# Patient Record
Sex: FEMALE | Race: WHITE | NOT HISPANIC OR LATINO | Employment: OTHER | URBAN - METROPOLITAN AREA
[De-identification: names, ages, dates, MRNs, and addresses within clinical notes are randomized per-mention and may not be internally consistent; named-entity substitution may affect disease eponyms.]

---

## 2017-02-06 ENCOUNTER — HOSPITAL ENCOUNTER (OUTPATIENT)
Facility: HOSPITAL | Age: 82
Setting detail: OBSERVATION
LOS: 1 days | Discharge: HOME | End: 2017-02-07
Attending: EMERGENCY MEDICINE | Admitting: INTERNAL MEDICINE

## 2017-02-06 ENCOUNTER — APPOINTMENT (OUTPATIENT)
Dept: GENERAL RADIOLOGY | Facility: HOSPITAL | Age: 82
End: 2017-02-06

## 2017-02-06 DIAGNOSIS — E87.6 HYPOKALEMIA: ICD-10-CM

## 2017-02-06 DIAGNOSIS — I49.5 SICK SINUS SYNDROME (HCC): Primary | ICD-10-CM

## 2017-02-06 LAB
ALBUMIN SERPL-MCNC: 3.6 G/DL (ref 3.5–5.2)
ALBUMIN/GLOB SERPL: 1.2 G/DL
ALP SERPL-CCNC: 58 U/L (ref 39–117)
ALT SERPL W P-5'-P-CCNC: 18 U/L (ref 1–33)
ANION GAP SERPL CALCULATED.3IONS-SCNC: 16.7 MMOL/L
AST SERPL-CCNC: 27 U/L (ref 1–32)
BASOPHILS # BLD AUTO: 0.01 10*3/MM3 (ref 0–0.2)
BASOPHILS NFR BLD AUTO: 0.2 % (ref 0–1.5)
BILIRUB SERPL-MCNC: 0.4 MG/DL (ref 0.1–1.2)
BUN BLD-MCNC: 31 MG/DL (ref 8–23)
BUN/CREAT SERPL: 24.6 (ref 7–25)
CALCIUM SPEC-SCNC: 9.1 MG/DL (ref 8.6–10.5)
CHLORIDE SERPL-SCNC: 98 MMOL/L (ref 98–107)
CO2 SERPL-SCNC: 22.3 MMOL/L (ref 22–29)
CREAT BLD-MCNC: 1.26 MG/DL (ref 0.57–1)
DEPRECATED RDW RBC AUTO: 43.4 FL (ref 37–54)
EOSINOPHIL # BLD AUTO: 0.01 10*3/MM3 (ref 0–0.7)
EOSINOPHIL NFR BLD AUTO: 0.2 % (ref 0.3–6.2)
ERYTHROCYTE [DISTWIDTH] IN BLOOD BY AUTOMATED COUNT: 13.9 % (ref 11.7–13)
GFR SERPL CREATININE-BSD FRML MDRD: 40 ML/MIN/1.73
GLOBULIN UR ELPH-MCNC: 3 GM/DL
GLUCOSE BLD-MCNC: 145 MG/DL (ref 65–99)
HCT VFR BLD AUTO: 37.6 % (ref 35.6–45.5)
HGB BLD-MCNC: 12.9 G/DL (ref 11.9–15.5)
HOLD SPECIMEN: NORMAL
HOLD SPECIMEN: NORMAL
IMM GRANULOCYTES # BLD: 0 10*3/MM3 (ref 0–0.03)
IMM GRANULOCYTES NFR BLD: 0 % (ref 0–0.5)
INR PPP: 1.01 (ref 0.9–1.1)
LYMPHOCYTES # BLD AUTO: 1.07 10*3/MM3 (ref 0.9–4.8)
LYMPHOCYTES NFR BLD AUTO: 26.6 % (ref 19.6–45.3)
MAGNESIUM SERPL-MCNC: 1.8 MG/DL (ref 1.6–2.4)
MCH RBC QN AUTO: 29.3 PG (ref 26.9–32)
MCHC RBC AUTO-ENTMCNC: 34.3 G/DL (ref 32.4–36.3)
MCV RBC AUTO: 85.5 FL (ref 80.5–98.2)
MONOCYTES # BLD AUTO: 0.42 10*3/MM3 (ref 0.2–1.2)
MONOCYTES NFR BLD AUTO: 10.4 % (ref 5–12)
NEUTROPHILS # BLD AUTO: 2.51 10*3/MM3 (ref 1.9–8.1)
NEUTROPHILS NFR BLD AUTO: 62.6 % (ref 42.7–76)
NT-PROBNP SERPL-MCNC: 58.5 PG/ML (ref 0–1800)
PLATELET # BLD AUTO: 133 10*3/MM3 (ref 140–500)
PMV BLD AUTO: 11.4 FL (ref 6–12)
POTASSIUM BLD-SCNC: 3.4 MMOL/L (ref 3.5–5.2)
PROT SERPL-MCNC: 6.6 G/DL (ref 6–8.5)
PROTHROMBIN TIME: 12.9 SECONDS (ref 11.7–14.2)
RBC # BLD AUTO: 4.4 10*6/MM3 (ref 3.9–5.2)
SODIUM BLD-SCNC: 137 MMOL/L (ref 136–145)
T4 FREE SERPL-MCNC: 1.44 NG/DL (ref 0.93–1.7)
TROPONIN T SERPL-MCNC: <0.01 NG/ML (ref 0–0.03)
TSH SERPL DL<=0.05 MIU/L-ACNC: 0.95 MIU/ML (ref 0.27–4.2)
WBC NRBC COR # BLD: 4.02 10*3/MM3 (ref 4.5–10.7)
WHOLE BLOOD HOLD SPECIMEN: NORMAL
WHOLE BLOOD HOLD SPECIMEN: NORMAL

## 2017-02-06 PROCEDURE — C1898 LEAD, PMKR, OTHER THAN TRANS: HCPCS | Performed by: INTERNAL MEDICINE

## 2017-02-06 PROCEDURE — 99222 1ST HOSP IP/OBS MODERATE 55: CPT | Performed by: INTERNAL MEDICINE

## 2017-02-06 PROCEDURE — 93010 ELECTROCARDIOGRAM REPORT: CPT | Performed by: INTERNAL MEDICINE

## 2017-02-06 PROCEDURE — 93005 ELECTROCARDIOGRAM TRACING: CPT | Performed by: EMERGENCY MEDICINE

## 2017-02-06 PROCEDURE — 25010000002 FENTANYL CITRATE (PF) 100 MCG/2ML SOLUTION: Performed by: INTERNAL MEDICINE

## 2017-02-06 PROCEDURE — 25010000002 MIDAZOLAM PER 1 MG: Performed by: INTERNAL MEDICINE

## 2017-02-06 PROCEDURE — 33208 INSRT HEART PM ATRIAL & VENT: CPT | Performed by: INTERNAL MEDICINE

## 2017-02-06 PROCEDURE — 71010 HC CHEST PA OR AP: CPT

## 2017-02-06 PROCEDURE — C1769 GUIDE WIRE: HCPCS | Performed by: INTERNAL MEDICINE

## 2017-02-06 PROCEDURE — 85025 COMPLETE CBC W/AUTO DIFF WBC: CPT | Performed by: EMERGENCY MEDICINE

## 2017-02-06 PROCEDURE — 25010000003 CEFAZOLIN PER 500 MG: Performed by: INTERNAL MEDICINE

## 2017-02-06 PROCEDURE — 85610 PROTHROMBIN TIME: CPT | Performed by: EMERGENCY MEDICINE

## 2017-02-06 PROCEDURE — C1785 PMKR, DUAL, RATE-RESP: HCPCS | Performed by: INTERNAL MEDICINE

## 2017-02-06 PROCEDURE — 99152 MOD SED SAME PHYS/QHP 5/>YRS: CPT | Performed by: INTERNAL MEDICINE

## 2017-02-06 PROCEDURE — 83880 ASSAY OF NATRIURETIC PEPTIDE: CPT | Performed by: EMERGENCY MEDICINE

## 2017-02-06 PROCEDURE — 83735 ASSAY OF MAGNESIUM: CPT | Performed by: EMERGENCY MEDICINE

## 2017-02-06 PROCEDURE — 99285 EMERGENCY DEPT VISIT HI MDM: CPT

## 2017-02-06 PROCEDURE — C1892 INTRO/SHEATH,FIXED,PEEL-AWAY: HCPCS | Performed by: INTERNAL MEDICINE

## 2017-02-06 PROCEDURE — 84443 ASSAY THYROID STIM HORMONE: CPT | Performed by: EMERGENCY MEDICINE

## 2017-02-06 PROCEDURE — 93041 RHYTHM ECG TRACING: CPT | Performed by: EMERGENCY MEDICINE

## 2017-02-06 PROCEDURE — 80053 COMPREHEN METABOLIC PANEL: CPT | Performed by: EMERGENCY MEDICINE

## 2017-02-06 PROCEDURE — 84439 ASSAY OF FREE THYROXINE: CPT | Performed by: EMERGENCY MEDICINE

## 2017-02-06 PROCEDURE — 99153 MOD SED SAME PHYS/QHP EA: CPT | Performed by: INTERNAL MEDICINE

## 2017-02-06 PROCEDURE — 84484 ASSAY OF TROPONIN QUANT: CPT | Performed by: EMERGENCY MEDICINE

## 2017-02-06 DEVICE — PACEMAKER
Type: IMPLANTABLE DEVICE | Status: FUNCTIONAL
Brand: ESSENTIO™ DR

## 2017-02-06 DEVICE — PACE/SENSE LEAD
Type: IMPLANTABLE DEVICE | Status: FUNCTIONAL
Brand: INGEVITY™ MRI

## 2017-02-06 DEVICE — IMPLANTABLE DEVICE: Type: IMPLANTABLE DEVICE | Status: FUNCTIONAL

## 2017-02-06 RX ORDER — SODIUM CHLORIDE 9 MG/ML
INJECTION, SOLUTION INTRAVENOUS CONTINUOUS PRN
Status: DISCONTINUED | OUTPATIENT
Start: 2017-02-06 | End: 2017-02-06 | Stop reason: HOSPADM

## 2017-02-06 RX ORDER — LIDOCAINE HYDROCHLORIDE 10 MG/ML
INJECTION, SOLUTION INFILTRATION; PERINEURAL AS NEEDED
Status: DISCONTINUED | OUTPATIENT
Start: 2017-02-06 | End: 2017-02-06 | Stop reason: HOSPADM

## 2017-02-06 RX ORDER — POTASSIUM CHLORIDE 1.5 G/1.77G
40 POWDER, FOR SOLUTION ORAL ONCE
Status: COMPLETED | OUTPATIENT
Start: 2017-02-06 | End: 2017-02-06

## 2017-02-06 RX ORDER — SODIUM CHLORIDE 0.9 % (FLUSH) 0.9 %
10 SYRINGE (ML) INJECTION AS NEEDED
Status: DISCONTINUED | OUTPATIENT
Start: 2017-02-06 | End: 2017-02-07 | Stop reason: HOSPADM

## 2017-02-06 RX ORDER — ONDANSETRON 2 MG/ML
4 INJECTION INTRAMUSCULAR; INTRAVENOUS EVERY 6 HOURS PRN
Status: DISCONTINUED | OUTPATIENT
Start: 2017-02-06 | End: 2017-02-07 | Stop reason: HOSPADM

## 2017-02-06 RX ORDER — ASPIRIN 325 MG
325 TABLET ORAL ONCE
Status: DISCONTINUED | OUTPATIENT
Start: 2017-02-06 | End: 2017-02-06

## 2017-02-06 RX ORDER — SODIUM CHLORIDE 0.9 % (FLUSH) 0.9 %
1-10 SYRINGE (ML) INJECTION AS NEEDED
Status: DISCONTINUED | OUTPATIENT
Start: 2017-02-06 | End: 2017-02-07 | Stop reason: HOSPADM

## 2017-02-06 RX ORDER — VALSARTAN AND HYDROCHLOROTHIAZIDE 160; 12.5 MG/1; MG/1
1 TABLET, FILM COATED ORAL DAILY
Status: DISCONTINUED | OUTPATIENT
Start: 2017-02-06 | End: 2017-02-07 | Stop reason: HOSPADM

## 2017-02-06 RX ORDER — FENTANYL CITRATE 50 UG/ML
INJECTION, SOLUTION INTRAMUSCULAR; INTRAVENOUS AS NEEDED
Status: DISCONTINUED | OUTPATIENT
Start: 2017-02-06 | End: 2017-02-06 | Stop reason: HOSPADM

## 2017-02-06 RX ORDER — AMLODIPINE BESYLATE 5 MG/1
5 TABLET ORAL
Status: DISCONTINUED | OUTPATIENT
Start: 2017-02-06 | End: 2017-02-07 | Stop reason: HOSPADM

## 2017-02-06 RX ORDER — OXYCODONE HYDROCHLORIDE AND ACETAMINOPHEN 5; 325 MG/1; MG/1
1 TABLET ORAL EVERY 4 HOURS PRN
Status: DISCONTINUED | OUTPATIENT
Start: 2017-02-06 | End: 2017-02-07 | Stop reason: HOSPADM

## 2017-02-06 RX ORDER — MIDAZOLAM HYDROCHLORIDE 1 MG/ML
INJECTION INTRAMUSCULAR; INTRAVENOUS AS NEEDED
Status: DISCONTINUED | OUTPATIENT
Start: 2017-02-06 | End: 2017-02-06 | Stop reason: HOSPADM

## 2017-02-06 RX ADMIN — SODIUM CHLORIDE 500 ML: 9 INJECTION, SOLUTION INTRAVENOUS at 12:51

## 2017-02-06 RX ADMIN — CEFAZOLIN SODIUM 1 G: 1 INJECTION, SOLUTION INTRAVENOUS at 15:43

## 2017-02-06 RX ADMIN — POTASSIUM CHLORIDE 40 MEQ: 1.5 POWDER, FOR SOLUTION ORAL at 13:53

## 2017-02-06 NOTE — H&P
Patient Name: Alexandria Alcocer  Age/Sex: 86 y.o. female  : 3/8/1930  MRN: [id number]    Date of Admission: 2017  Date of Encounter Visit: 17  Encounter Provider: Liban Suazo MD  Place of Service: [facility] CARDIOLOGY      Referring Provider: Liban Suazo MD  Patient Care Team:  Jen Schmidt MD as PCP - General  Jen Schmidt MD as PCP - Family Medicine    Subjective:     Admitted for: sick sinus syndrome    Chief Complaint: palpitations and weakness    History of Present Illness:  Alexandria Alcocer is a 86 y.o. female, who is known to me, with a documented h/o atrial arrhythmias, carotid atherosclerosis, syncope, HTN, hyperglycemia, hypercholesterolemia, GERD and Bell's palsy. She was last seen by me on 10/29/2015 at which time her rhythm showed sinus arrest/sinus arrhythmia. She was asymptomatic and pauses were not long enough to warrant a permanent pacemaker. She was to contact my office if she became symptomatic at any time. No medication changes were made.     She presented to the ED this am with c/o palpitations, generalized weakness and near-syncope with diaphoresis. Prior to arrival to ED she had chest tightness, soa and lightheadedness, but upon arrival she only c/o soa. Telemetry showed an irregular heart rhythm with periods of bradycardia and tachycardia, rates . BP 92/69.BUN/Creat 31/1.26, K 3.4, troponin neg, thyroid panel neg, proBNP 58. CXR neg.      We are admitting for possible permanent pacemaker placement. She is NPO.    Previous testing:   Holter 2015    Echo 2014      Past Medical History:  Past Medical History   Diagnosis Date   • Arthritis    • Bell's palsy    • Cataract    • Cholelithiasis       s/p cholecystectomy ,    • Degenerative disc disease, thoracic    • Esophageal reflux      10/08 Dr Fuller   • Esophagitis      EDG 10/08 Dr Fuller   • H/O Doppler ultrasound of artery carotid      nl ,  has a plaque without stenosis in the R bifurcation ICA   • H/O echocardiogram 2D      07/2009: EF 60%, mild MR, mild TR, PA pressure 38, diastolic dysfunction 07/2014: EF 55%, grade I diast dysf-n   • Hemorrhoids    • Herpes zoster      7/2011   • Hypercholesterolemia    • Hyperglycemia    • Lung nodule seen on imaging study      none seen on the x ray 11/2014   • Mammogram abnormal      2013 had normal f/u evaluation normal 7/14 [facility] 7/25/15   • Near syncope 8/26/2016   • Neuralgia, postherpetic    • Occupational bursitis      right shoulder   • Osteopenia      Dexa 4/7/2011   • Postherpetic polyneuropathy    • Tubular adenoma of colon       . 10/08, 3 polyps with mild dysplasia, 11/11 2 polyps transverse colon, also had hyperplastic polyp in 2002   • Vasovagal syncope      1992   • Visit for screening mammogram      nl 06/18/13, 07/2014 [facility]       Past Surgical History   Procedure Laterality Date   • Bronchoscopy       1996, bronchoscopy with Bx - inflammatory lung nodule   • Cataract extraction     • Cholecystectomy       Dr Ruiz   • Colonoscopy       · , 11/29/2011, tubular adenoma 3-5 mm x 2 in the transverse colon.   • Polypectomy     • Incisional breast biopsy       1998 also had ducrogram due to D/C   • Tonsillectomy     • Total abdominal hysterectomy       with removal of both ovaries       Home Medications:     (Not in a hospital admission)    Allergies:  No Known Allergies    Past Social History:  Social History     Social History   • Marital status: Single     Spouse name: N/A   • Number of children: N/A   • Years of education: N/A     Occupational History   • Not on file.     Social History Main Topics   • Smoking status: Never Smoker   • Smokeless tobacco: Not on file   • Alcohol use No   • Drug use: No   • Sexual activity: Defer     Other Topics Concern   • Not on file     Social History Narrative   • No narrative on file        Past Family History:  Family History   Problem  Relation Age of Onset   • Breast cancer Mother    • Lung cancer Mother    • Cardiomyopathy Mother    • Hypertension Mother    • Breast cancer Sister    • Lymphoma Sister    • Hypertension Sister    • Macular degeneration Sister    • Diabetes Brother    • Hypertension Brother    • Lung cancer Brother        Review of Systems: All systems reviewed. Pertinent positives identified in HPI. All other systems are negative.     REVIEW OF SYSTEMS:   CONSTITUTIONAL: No weight loss, fever, chills, complain of dizziness and fatigue near syncope   HEENT: Eyes: No visual loss, blurred vision, double vision or yellow sclerae. Ears, Nose, Throat: No hearing loss, sneezing, congestion, runny nose or sore throat.   SKIN: No rash or itching.     RESPIRATORY: No shortness of breath, hemoptysis, cough or sputum.     GASTROINTESTINAL: No anorexia, nausea, vomiting or diarrhea. No abdominal pain, bright red blood per rectum or melena.  GENITOURINARY: No burning on urination, hematuria or increased frequency.  NEUROLOGICAL: No headache, dizziness, syncope, paralysis, ataxia, numbness or tingling in the extremities. No change in bowel or bladder control.   MUSCULOSKELETAL: No muscle, back pain, joint pain or stiffness.   HEMATOLOGIC: No anemia, bleeding or bruising.   LYMPHATICS: No enlarged nodes. No history of splenectomy.   PSYCHIATRIC: No history of depression, anxiety, hallucinations.   ENDOCRINOLOGIC: No reports of sweating, cold or heat intolerance. No polyuria or polydipsia.       Objective:     Objective:  Temp:  [97.8 °F (36.6 °C)] 97.8 °F (36.6 °C)  Heart Rate:  [] 75  Resp:  [16-18] 18  BP: ()/(56-81) 117/56    Intake/Output Summary (Last 24 hours) at 02/06/17 1359  Last data filed at 02/06/17 1311   Gross per 24 hour   Intake    500 ml   Output      0 ml   Net    500 ml     Body mass index is 20.6 kg/(m^2).  Last 3 weights    02/06/17  1200   Weight: 120 lb (54.4 kg)           Physical Exam:   General Appearance  Well developed, cooperative and well nourished and no acute distress   Head Normocephalic, without abnormality, atraumatic   Ears Ears appear intact with no abnormalities noted   Throat No oral lesions, no thrush, oral mucosa moist   Neck No adenopathy, supple, trachea midline, no thyromegaly, no carotid bruit, no JVD   Back No skin lesions, erythema or scars, no tenderness to percussion or palptaion,range of motion is normal   Lungs Clear to auscultation,respirations regular, even and unlabored   Heart  irregular rhythm no S3 nor S4 no murmurs are noted.   Chest wall No abnormalities observed   Abdomen Normal bowel sounds, no masses, no hepatomegaly,    Extremities Moves all extremities well, no edema, no cyanosis, no redness   Pulses Pulses palpable and equal bilaterally. Normal radial, carotid, femoral, dorsalis pedis and posterior tibial pulses bilaterally. Normal abdominal aorta   Skin No bleeding, bruising or rash   Psyhiatric Alert and oriented x 3, normal mood and affect        Lab Review:       Results from last 7 days  Lab Units 02/06/17  1227   SODIUM mmol/L 137   POTASSIUM mmol/L 3.4*   CHLORIDE mmol/L 98   TOTAL CO2 mmol/L 22.3   BUN mg/dL 31*   CREATININE mg/dL 1.26*   GLUCOSE mg/dL 145*   CALCIUM mg/dL 9.1   AST (SGOT) U/L 27   ALT (SGPT) U/L 18       Results from last 7 days  Lab Units 02/06/17  1227   TROPONIN T ng/mL <0.010       Results from last 7 days  Lab Units 02/06/17  1227   WBC 10*3/mm3 4.02*   HEMOGLOBIN g/dL 12.9   HEMATOCRIT % 37.6   PLATELETS 10*3/mm3 133*       Results from last 7 days  Lab Units 02/06/17  1227   INR  1.01           Results from last 7 days  Lab Units 02/06/17  1227   MAGNESIUM mg/dL 1.8           Results from last 7 days  Lab Units 02/06/17  1227   PROBNP pg/mL 58.5           Results from last 7 days  Lab Units 02/06/17  1227   TSH mIU/mL 0.947       Imaging:    Imaging Results (most recent)     Procedure Component Value Units Date/Time    XR Chest 1 View [[id number]]       Updated:  02/06/17 1255          EKG:         Baseline:     I personally viewed and interpreted the patient's EKG/Telemetry data.    Assessment:   Assessment/Plan       Active Problems:    Sick sinus syndrome    The patient has tachybradycardia syndrome.  She is very symptomatic with pauses.  This is an issue that has been present now for 2 years at least and is now worse.  The patient is now much more symptomatic especially in the last week or so.  I explained to her permanent pacemaker implant which she needs and also the requirement for medication.  The risks and benefits of the pacemaker have been explained in detail and she is agreeable to proceed.    Plan:           Thank you for allowing me to participate in the care of Alexandria Alcocer. Feel free to contact me directly with any further questions or concerns.    Liban Suazo MD  [facility]  02/06/17  1:59 PM

## 2017-02-06 NOTE — ED NOTES
Patient reports that congestion started Thursday.  This morning patient reports eating breakfast and went to lay on couch.  While sitting on couch patient had near syncope with diaphoresis.  Patient loses breath while talking.  Reports feeling chest tightness that is resolving.     Mine Llanes RN  02/06/17 1154       Mine Lalnes RN  02/06/17 1153

## 2017-02-06 NOTE — ED NOTES
External pacer pads applied to pt's chest and back upon her arrival to ER #[id number], which were then hooked up to Zoll monitor along with Zoll leads for monitoring.     Milana Ellington RN  02/06/17 5848

## 2017-02-06 NOTE — PLAN OF CARE
Problem: Patient Care Overview (Adult)  Goal: Plan of Care Review  Outcome: Ongoing (interventions implemented as appropriate)    02/06/17 1446   Coping/Psychosocial Response Interventions   Plan Of Care Reviewed With patient   Patient Care Overview   Progress no change   Outcome Evaluation   Outcome Summary/Follow up Plan PT to go for PPM this afternoon (here for SSS. On arrival to [facility] HR 60-80's in SR). Other VSS. No c/o CP.        Goal: Adult Individualization and Mutuality  Outcome: Ongoing (interventions implemented as appropriate)  Goal: Discharge Needs Assessment  Outcome: Ongoing (interventions implemented as appropriate)    Problem: Cardiac Output, Decreased (Adult)  Goal: Identify Related Risk Factors and Signs and Symptoms  Outcome: Ongoing (interventions implemented as appropriate)  Goal: Adequate Cardiac Output/Effective Tissue Perfusion  Outcome: Ongoing (interventions implemented as appropriate)    Problem: Pain, Acute (Adult)  Goal: Identify Related Risk Factors and Signs and Symptoms  Outcome: Ongoing (interventions implemented as appropriate)  Goal: Acceptable Pain Control/Comfort Level  Outcome: Ongoing (interventions implemented as appropriate)    Problem: Fall Risk (Adult)  Goal: Identify Related Risk Factors and Signs and Symptoms  Outcome: Ongoing (interventions implemented as appropriate)  Goal: Absence of Falls  Outcome: Ongoing (interventions implemented as appropriate)    Problem: Arrhythmia/Dysrhythmia (Symptomatic) (Adult)  Goal: Signs and Symptoms of Listed Potential Problems Will be Absent or Manageable (Arrhythmia/Dysrhythmia)  Outcome: Ongoing (interventions implemented as appropriate)

## 2017-02-06 NOTE — ED NOTES
Arrived in [facility] and placed pt on in-room monitors.  External pacer pads left in place on pt.  Requested that receiving RN, Niecy, bring their unit's Zoll monitor to hook to pacer pads as we unhooked the ER Zoll monitor.  Niecy, RN, stated she would get it shortly.     Milana Ellington RN  02/06/17 7088

## 2017-02-06 NOTE — ED PROVIDER NOTES
EMERGENCY DEPARTMENT ENCOUNTER    CHIEF COMPLAINT  Chief Complaint: palpitations and weakness  History given by: patient and pt's niece   History limited by: nothing   Room Number: 2211/1  PMD: Roxy Schmidt MD    HPI:  Pt is a 86 y.o. female who presents complaining of palpitations and generalized weakness. In triage, pt described a near-syncopal episode with diaphoresis that occurred this morning while pt was lying down. Pt has also complained of improving chest tightness, SOA, and lightheadedness, but her only complaint at this time is SOA. Per pt's niece, Dr. Suazo thought that pt might need a pacemaker soon the last time he saw her.      Onset: this morning  Timing: constant   Location: generalized   Radiation: does not radiate   Quality: new  Intensity/Severity: moderate   Progression: unchanged   Associated Symptoms: palpitations, weakness, SOA, lightheadedness, chest pain, near-syncope  Aggravating Factors: none specified   Alleviating Factors: none specified   Previous Episodes: none specified   Treatment before arrival: none specified     PAST MEDICAL HISTORY  Active Ambulatory Problems     Diagnosis Date Noted   • Benign essential hypertension 03/21/2016   • Carotid atherosclerosis 03/21/2016   • Chronic low back pain 03/21/2016   • Hyperlipidemia 03/21/2016   • Impaired fasting glucose 03/21/2016   • Osteopenia 03/21/2016   • Sinus arrhythmia 03/21/2016   • Health care maintenance 06/10/2016   • Lightheadedness 10/07/2016   • Carpal tunnel syndrome of right wrist 12/16/2016     Resolved Ambulatory Problems     Diagnosis Date Noted   • Near syncope 08/26/2016     Past Medical History   Diagnosis Date   • Arthritis    • Bell's palsy    • Cataract    • Cholelithiasis    • Degenerative disc disease, thoracic    • Esophageal reflux    • Esophagitis    • H/O Doppler ultrasound of artery carotid    • H/O echocardiogram 2D    • Hemorrhoids    • Herpes zoster    • Hypercholesterolemia    • Hyperglycemia     • Lung nodule seen on imaging study    • Mammogram abnormal    • Neuralgia, postherpetic    • Occupational bursitis    • Postherpetic polyneuropathy    • Tubular adenoma of colon    • Vasovagal syncope    • Visit for screening mammogram        PAST SURGICAL HISTORY  Past Surgical History   Procedure Laterality Date   • Bronchoscopy       1996, bronchoscopy with Bx - inflammatory lung nodule   • Cataract extraction     • Cholecystectomy       Dr Ruiz   • Colonoscopy       · , 11/29/2011, tubular adenoma 3-5 mm x 2 in the transverse colon.   • Polypectomy     • Incisional breast biopsy       1998 also had ducrogram due to D/C   • Tonsillectomy     • Total abdominal hysterectomy       with removal of both ovaries       FAMILY HISTORY  Family History   Problem Relation Age of Onset   • Breast cancer Mother    • Lung cancer Mother    • Cardiomyopathy Mother    • Hypertension Mother    • Breast cancer Sister    • Lymphoma Sister    • Hypertension Sister    • Macular degeneration Sister    • Diabetes Brother    • Hypertension Brother    • Lung cancer Brother        SOCIAL HISTORY  Social History     Social History   • Marital status: Single     Spouse name: N/A   • Number of children: N/A   • Years of education: N/A     Occupational History   • Not on file.     Social History Main Topics   • Smoking status: Never Smoker   • Smokeless tobacco: Not on file   • Alcohol use No   • Drug use: No   • Sexual activity: Defer     Other Topics Concern   • Not on file     Social History Narrative   • No narrative on file       ALLERGIES  Review of patient's allergies indicates no known allergies.    REVIEW OF SYSTEMS  Review of Systems   Constitutional: Negative for fever.   HENT: Negative for sore throat.    Eyes: Negative.    Respiratory: Positive for shortness of breath. Negative for cough.    Cardiovascular: Positive for palpitations. Negative for chest pain.   Gastrointestinal: Negative for abdominal pain, diarrhea  and vomiting.   Genitourinary: Negative for dysuria.   Musculoskeletal: Negative for neck pain.   Skin: Negative for rash.   Allergic/Immunologic: Negative.    Neurological: Positive for weakness (generalized) and light-headedness. Negative for numbness and headaches.   Hematological: Negative.    Psychiatric/Behavioral: Negative.    All other systems reviewed and are negative.      PHYSICAL EXAM  ED Triage Vitals   Temp Heart Rate Resp BP SpO2   02/06/17 1200 02/06/17 1200 02/06/17 1200 02/06/17 1200 02/06/17 1200   97.8 °F (36.6 °C) 147 16 92/69 95 %      Temp src Heart Rate Source Patient Position BP Location FiO2 (%)   02/06/17 1200 02/06/17 1200 02/06/17 1200 02/06/17 1200 --   Tympanic Monitor Sitting Right arm        Physical Exam   Constitutional: She is oriented to person, place, and time.   Eyes: Pupils are equal, round, and reactive to light.   Cardiovascular:   Periods of bradycardia in 40's and then rapid Afib in the 150's.    Pulmonary/Chest: She has rales in the right lower field and the left lower field.   Abdominal: Soft. There is no tenderness.   Musculoskeletal: She exhibits no edema or tenderness.   Neurological: She is alert and oriented to person, place, and time.   Nonfocal neuro exam.   Skin: No rash noted.   Psychiatric: Her mood appears anxious.       LAB RESULTS  Lab Results (last 24 hours)     Procedure Component Value Units Date/Time    CBC & Differential [[id number]] Collected:  02/06/17 1227    Specimen:  Blood Updated:  02/06/17 1240    Narrative:       The following orders were created for panel order CBC & Differential.  Procedure                               Abnormality         Status                     ---------                               -----------         ------                     CBC Auto Differential[[id number]]         Abnormal            Final result                 Please view results for these tests on the individual orders.    Comprehensive Metabolic Panel [[id number]]   (Abnormal) Collected:  02/06/17 1227    Specimen:  Blood Updated:  02/06/17 1307     Glucose 145 (H) mg/dL      BUN 31 (H) mg/dL      Creatinine 1.26 (H) mg/dL      Sodium 137 mmol/L      Potassium 3.4 (L) mmol/L      Chloride 98 mmol/L      CO2 22.3 mmol/L      Calcium 9.1 mg/dL      Total Protein 6.6 g/dL      Albumin 3.60 g/dL      ALT (SGPT) 18 U/L      AST (SGOT) 27 U/L      Alkaline Phosphatase 58 U/L      Total Bilirubin 0.4 mg/dL      eGFR Non African Amer 40 (L) mL/min/1.73      Globulin 3.0 gm/dL      A/G Ratio 1.2 g/dL      BUN/Creatinine Ratio 24.6      Anion Gap 16.7 mmol/L     Narrative:       The MDRD GFR formula is only valid for adults with stable renal function between ages 18 and 70.    Troponin [[id number]]  (Normal) Collected:  02/06/17 1227    Specimen:  Blood Updated:  02/06/17 1312     Troponin T <0.010 ng/mL     Narrative:       Troponin T Reference Ranges:  Less than 0.03 ng/mL:    Negative for AMI  0.03 to 0.09 ng/mL:      Indeterminant for AMI  Greater than 0.09 ng/mL: Positive for AMI    CBC Auto Differential [[id number]]  (Abnormal) Collected:  02/06/17 1227    Specimen:  Blood Updated:  02/06/17 1240     WBC 4.02 (L) 10*3/mm3      RBC 4.40 10*6/mm3      Hemoglobin 12.9 g/dL      Hematocrit 37.6 %      MCV 85.5 fL      MCH 29.3 pg      MCHC 34.3 g/dL      RDW 13.9 (H) %      RDW-SD 43.4 fl      MPV 11.4 fL      Platelets 133 (L) 10*3/mm3      Neutrophil % 62.6 %      Lymphocyte % 26.6 %      Monocyte % 10.4 %      Eosinophil % 0.2 (L) %      Basophil % 0.2 %      Immature Grans % 0.0 %      Neutrophils, Absolute 2.51 10*3/mm3      Lymphocytes, Absolute 1.07 10*3/mm3      Monocytes, Absolute 0.42 10*3/mm3      Eosinophils, Absolute 0.01 10*3/mm3      Basophils, Absolute 0.01 10*3/mm3      Immature Grans, Absolute 0.00 10*3/mm3     Protime-INR [[id number]]  (Normal) Collected:  02/06/17 1227    Specimen:  Blood Updated:  02/06/17 1250     Protime 12.9 Seconds      INR 1.01     Magnesium  [[id number]]  (Normal) Collected:  02/06/17 1227    Specimen:  Blood Updated:  02/06/17 1307     Magnesium 1.8 mg/dL     T4, Free [[id number]]  (Normal) Collected:  02/06/17 1227    Specimen:  Blood Updated:  02/06/17 1312     Free T4 1.44 ng/dL     TSH [[id number]]  (Normal) Collected:  02/06/17 1227    Specimen:  Blood Updated:  02/06/17 1312     TSH 0.947 mIU/mL     BNP [[id number]]  (Normal) Collected:  02/06/17 1227    Specimen:  Blood Updated:  02/06/17 1312     proBNP 58.5 pg/mL     Narrative:       Among patients with dyspnea, NT-proBNP is highly sensitive for the detection of acute congestive heart failure. In addition NT-proBNP of <300 pg/ml effectively rules out acute congestive heart failure with 99% negative predictive value.          I ordered the above labs and reviewed the results    RADIOLOGY  XR Chest 1 View   Final Result   Negative chest x-ray.       This report was finalized on 2/6/2017 3:23 PM by Dr. Fred Jasso MD.             CXR is negative.   I ordered the above noted radiological studies. Interpreted by radiologist. Reviewed by me in PACS.       PROCEDURES  Procedures  EKG    EKG time: 1204  Rhythm/Rate: Sick sinus syndrome, rate varying from   No Acute Ischemia  Non-Specific ST-T changes  LVH with repol  Progressed compared to prior on 2015    Interpreted Contemporaneously by me.  Independently viewed by me     Rhythm strip:   Time: 1232  Sinus bradycardia in 30's at times and rapid Afib in 150's at times      PROGRESS AND CONSULTS  ED Course     1203: Ordered EKG for further evaluation.     1219:  Ordered labs for further evaluation. Ordered fluids for hydration.     1225: Ordered BNP and CXR for further evaluation.     1303: Discussed pt's case with Dr. Suazo (cardilogy), including concerns regarding sick sinus syndrome. He will admit pt to [facility] and come consult pt in ED regarding likely pacemaker placement.     1307: Rechecked pt. Pt is now in a NSR at 74. Discussed plan to admit  and potential pacemaker placement. Pt understands and agrees with plan and all questions were addressed.   BP: 126/67 HR: 74 Temp: 97.8 °F (36.6 °C) (Tympanic) O2 sat: 100%    1314: Ordered Klor-con due to hypokalemia (K of 3.4).    1350: Dr. Suazo is now at bedside and plans to place pacemaker.    MEDICAL DECISION MAKING  Results were reviewed/discussed with the patient and they were also made aware of online access. Pt also made aware that some labs, such as cultures, will not be resulted during ER visit and follow up with PMD is necessary.     MDM  Number of Diagnoses or Management Options  Hypokalemia:   Sick sinus syndrome:      Amount and/or Complexity of Data Reviewed  Clinical lab tests: ordered and reviewed (Potassium: 3.4  BUN: 31  Creatinine: 1.26)  Tests in the radiology section of CPT®: ordered and reviewed  Tests in the medicine section of CPT®: ordered and reviewed (EKG time: 1204  Rhythm/Rate: Sick sinus syndrome, rate varying from   No Acute Ischemia  Non-Specific ST-T changes  LVH with repol  Progressed compared to prior on 2015    Interpreted Contemporaneously by me.  Independently viewed by me     Rhythm strip:   Time: 1232  Sinus bradycardia in 30's at times and rapid Afib in 150's at times  )  Decide to obtain previous medical records or to obtain history from someone other than the patient: yes  Review and summarize past medical records: yes (Pt last saw Dr. Suazo in 2015. She had a sinus arrhythmia at that time.   Pt had HOLTER monitor placed in September. )         DIAGNOSIS  Final diagnoses:   Sick sinus syndrome   Hypokalemia       DISPOSITION  ADMISSION    Discussed treatment plan and reason for admission with pt/family and admitting physician.  Pt/family voiced understanding of the plan for admission for further testing/treatment as needed.     Latest Documented Vital Signs:  As of 7:02 PM  BP- 124/69 HR- 63 Temp- 97.4 °F (36.3 °C) (Oral) O2 sat- 95%    --  Documentation  assistance provided by beth Stokes for Carlitos Jones.  Information recorded by the [email or web address] was done at my direction and has been verified and validated by me.                      Judith Stokes  02/06/17 1357       Nick Jones MD  02/06/17 1900       Nick Jones MD  02/06/17 1909

## 2017-02-06 NOTE — ED NOTES
Per Dr. Suazo's nurse (@ nurses' station), ok to take pt to her admit room, 2211, to await her pacemaker insertion.   arrived at pt's BS per pt request for a minute of prayer prior to pt going to [facility].     Milana Ellington, RN  02/06/17 8796

## 2017-02-07 ENCOUNTER — CLINICAL SUPPORT NO REQUIREMENTS (OUTPATIENT)
Dept: CARDIOLOGY | Facility: CLINIC | Age: 82
End: 2017-02-07

## 2017-02-07 VITALS
HEART RATE: 72 BPM | OXYGEN SATURATION: 95 % | RESPIRATION RATE: 17 BRPM | HEIGHT: 65 IN | SYSTOLIC BLOOD PRESSURE: 119 MMHG | DIASTOLIC BLOOD PRESSURE: 53 MMHG | TEMPERATURE: 98.2 F | WEIGHT: 133 LBS | BODY MASS INDEX: 22.16 KG/M2

## 2017-02-07 DIAGNOSIS — I49.5 SICK SINUS SYNDROME (HCC): Primary | ICD-10-CM

## 2017-02-07 PROCEDURE — 99024 POSTOP FOLLOW-UP VISIT: CPT | Performed by: INTERNAL MEDICINE

## 2017-02-07 PROCEDURE — G0378 HOSPITAL OBSERVATION PER HR: HCPCS

## 2017-02-07 NOTE — DISCHARGE SUMMARY
DISCHARGE NOTE    Patient Name: Alexandria Alcocer  Age/Sex: 86 y.o. female  : 3/8/1930  MRN: [id number]    Date of Discharge:  2017   Date of Admit: 2017  Encounter Provider: Fred Nugent MD  Place of Service: [facility] CARDIOLOGY  Patient Care Team:  Jen Schmidt MD as PCP - General  Jen Schmidt MD as PCP - Family Medicine    Subjective:     Discharge Diagnosis:  Active Problems:    Sick sinus syndrome      Hospital Course: Alexandria Alcocer is a 86 y.o. female, who is known to me, with a documented h/o atrial arrhythmias, carotid atherosclerosis, syncope, HTN, hyperglycemia, hypercholesterolemia, GERD and Bell's palsy. She was last seen by me on 10/29/2015 at which time her rhythm showed sinus arrest/sinus arrhythmia. She was asymptomatic and pauses were not long enough to warrant a permanent pacemaker. She was to contact my office if she became symptomatic at any time. No medication changes were made.      She presented to the ED this am with c/o palpitations, generalized weakness and near-syncope with diaphoresis. Prior to arrival to ED she had chest tightness, soa and lightheadedness, but upon arrival she only c/o soa. Telemetry showed an irregular heart rhythm with periods of bradycardia and tachycardia, rates . BP 92/69.BUN/Creat 31/1.26, K 3.4, troponin neg, thyroid panel neg, proBNP 58. CXR neg. 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 Today she has ambulated and done well. Denies any chest pain, shortness of breath palpitations, lightheadedness or dizziness. Pacer site bandage is dry and intact. Denies any site pain and does not require narcotics. Patient will follow up with pacemaker clinic in 7 to 10 days and Dr. Suazo in 4 weeks.     Vital Signs  Temp:  [97.4 °F (36.3 °C)-98.4 °F (36.9 °C)] 98.2 °F (36.8 °C)  Heart  Rate:  [] 72  Resp:  [14-18] 17  BP: ()/() 119/53    Intake/Output Summary (Last 24 hours) at 02/07/17 1001  Last data filed at 02/06/17 1630   Gross per 24 hour   Intake    600 ml   Output      0 ml   Net    600 ml       Physical Exam:  Physical Exam   Constitutional: She is oriented to person, place, and time.   HENT:   Head: Normocephalic and atraumatic.   Eyes: Right eye exhibits no discharge. Left eye exhibits no discharge.   Neck: No JVD present. No thyromegaly present.   Cardiovascular: Normal rate and regular rhythm.  Exam reveals no gallop and no friction rub.    No murmur heard.  Pulmonary/Chest: Effort normal and breath sounds normal. She has no rales.   Abdominal: Soft. Bowel sounds are normal. There is no tenderness.   Musculoskeletal: Normal range of motion. She exhibits no edema or deformity.   Neurological: She is alert and oriented to person, place, and time. She exhibits normal muscle tone.   Skin: Skin is warm and dry. No erythema.   Psychiatric: She has a normal mood and affect. Her behavior is normal. Thought content normal.      Incision looks great  Labs:     Results from last 7 days  Lab Units 02/06/17  1227   SODIUM mmol/L 137   POTASSIUM mmol/L 3.4*   CHLORIDE mmol/L 98   TOTAL CO2 mmol/L 22.3   BUN mg/dL 31*   CREATININE mg/dL 1.26*   GLUCOSE mg/dL 145*   CALCIUM mg/dL 9.1   AST (SGOT) U/L 27   ALT (SGPT) U/L 18       Results from last 7 days  Lab Units 02/06/17  1227   TROPONIN T ng/mL <0.010       Results from last 7 days  Lab Units 02/06/17  1227   WBC 10*3/mm3 4.02*   HEMOGLOBIN g/dL 12.9   HEMATOCRIT % 37.6   PLATELETS 10*3/mm3 133*       Results from last 7 days  Lab Units 02/06/17  1227   INR  1.01       Results from last 7 days  Lab Units 02/06/17  1227   MAGNESIUM mg/dL 1.8           Results from last 7 days  Lab Units 02/06/17  1227   PROBNP pg/mL 58.5       Results from last 7 days  Lab Units 02/06/17  1227   TSH mIU/mL 0.947       Discharge Diet:          Dietary Orders            Start     Ordered    02/06/17 1652  Diet Regular; Cardiac  Diet Effective Now     Question Answer Comment   Diet Texture / Consistency Regular    Common Modifiers Cardiac        02/06/17 1651            Activity at Discharge:  as tolerated     Discharge Medications   Alexandria Alcocer   Home Medication Instructions VITOR:[id number]    Printed on:02/07/17 1001   Medication Information                      amLODIPine (NORVASC) 5 MG tablet  TAKE ONE TABLET BY MOUTH DAILY             aspirin (ASPIR) 81 MG EC tablet  Take  by mouth.             Calcium Carb-Cholecalciferol (CALCIUM 600 + D) 600-200 MG-UNIT tablet  Take  by mouth.             Cholecalciferol (VITAMIN D3) 2000 UNITS capsule  Take  by mouth.             Omega-3 Fatty Acids (FISH OIL) 1000 MG capsule capsule  Take  by mouth.             RESTASIS 0.05 % ophthalmic emulsion               simvastatin (ZOCOR) 20 MG tablet  TAKE ONE TABLET BY MOUTH EVERY NIGHT AT BEDTIME             valsartan-hydrochlorothiazide (DIOVAN HCT) 320-12.5 MG per tablet  Take 1 tablet by mouth Daily.                 Discharge disposition: home     Follow-up Appointments: Pacemaker clinic in 7-10 days   Referrals and Follow-ups to Schedule     Follow-Up    As directed    Please call [phone] and schedule a 4 week follow up with Dr. Suazo   Follow Up Details:  Pacer clinic in 7 to 10 days             Follow-up Information     Follow up with Roxy Schmidt MD .    Specialty:  Internal Medicine    Contact information:    [address] [place] [address]  [phone]          Future Appointments  Date Time Provider Department Center   3/8/2017 2:00 PM MD [id number] None   6/12/2017 10:15 AM LAB [facility] None   6/16/2017 11:30 AM MD [facility] None         Fred Nugent MD  02/07/17  10:01 AM

## 2017-02-15 ENCOUNTER — CLINICAL SUPPORT NO REQUIREMENTS (OUTPATIENT)
Dept: CARDIOLOGY | Facility: CLINIC | Age: 82
End: 2017-02-15

## 2017-02-15 DIAGNOSIS — I49.5 SICK SINUS SYNDROME (HCC): Primary | ICD-10-CM

## 2017-02-15 PROCEDURE — 93280 PM DEVICE PROGR EVAL DUAL: CPT | Performed by: INTERNAL MEDICINE

## 2017-03-08 ENCOUNTER — OFFICE VISIT (OUTPATIENT)
Dept: CARDIOLOGY | Facility: CLINIC | Age: 82
End: 2017-03-08

## 2017-03-08 ENCOUNTER — CLINICAL SUPPORT NO REQUIREMENTS (OUTPATIENT)
Dept: CARDIOLOGY | Facility: CLINIC | Age: 82
End: 2017-03-08

## 2017-03-08 VITALS
DIASTOLIC BLOOD PRESSURE: 72 MMHG | SYSTOLIC BLOOD PRESSURE: 152 MMHG | WEIGHT: 132 LBS | BODY MASS INDEX: 21.99 KG/M2 | HEART RATE: 59 BPM | HEIGHT: 65 IN

## 2017-03-08 DIAGNOSIS — I49.5 SICK SINUS SYNDROME (HCC): Primary | ICD-10-CM

## 2017-03-08 PROCEDURE — 99024 POSTOP FOLLOW-UP VISIT: CPT | Performed by: INTERNAL MEDICINE

## 2017-03-08 NOTE — PROGRESS NOTES
Date of Office Visit: 2017  Encounter Provider: Liban Suazo MD  Place of Service: [facility] CARDIOLOGY  Patient Name: Alexandria Alcocer  :3/8/1930      Chief Complaint   Patient presents with   • Irregular Heart Beat     History of Present Illness  The patient is an 85-year-old white female with a history of sick sinus syndrome.  She recently presented to the hospital severely bradycardic and symptomatic necessitating permanent pacemaker implant.  The patient is now a month out from her implant and feeling well or dizzy spells have completely resolved.  Her pacemaker function is normal.  Her incision has been checked and appears to be healing without difficulty.      Past Medical History   Diagnosis Date   • Arthritis    • Bell's palsy    • Cataract    • Cholelithiasis       s/p cholecystectomy ,    • Degenerative disc disease, thoracic    • Esophageal reflux      10/08 Dr Fuller   • Esophagitis      EDG 10/08 Dr Fuller   • H/O Doppler ultrasound of artery carotid      nl , has a plaque without stenosis in the R bifurcation ICA   • H/O echocardiogram 2D      2009: EF 60%, mild MR, mild TR, PA pressure 38, diastolic dysfunction 2014: EF 55%, grade I diast dysf-n   • Hemorrhoids    • Herpes zoster      2011   • Hypercholesterolemia    • Hyperglycemia    • Lung nodule seen on imaging study      none seen on the x ray 2014   • Mammogram abnormal       had normal f/u evaluation normal  [facility] 7/25/15   • Near syncope 2016   • Neuralgia, postherpetic    • Occupational bursitis      right shoulder   • Osteopenia      Dexa 2011   • Postherpetic polyneuropathy    • Tubular adenoma of colon       . 10/08, 3 polyps with mild dysplasia,  2 polyps transverse colon, also had hyperplastic polyp in    • Vasovagal syncope         • Visit for screening mammogram      nl 13, 2014 [facility]         Past Surgical  History   Procedure Laterality Date   • Bronchoscopy       1996, bronchoscopy with Bx - inflammatory lung nodule   • Cataract extraction     • Cholecystectomy       Dr Ruiz   • Colonoscopy       · , 11/29/2011, tubular adenoma 3-5 mm x 2 in the transverse colon.   • Polypectomy     • Incisional breast biopsy       1998 also had ducrogram due to D/C   • Tonsillectomy     • Total abdominal hysterectomy       with removal of both ovaries   • Cardiac electrophysiology procedure N/A 2/6/2017     Procedure: Pacemaker DC new, [employer];  Surgeon: Liban Suazo MD;  Location: [facility] INVASIVE LOCATION;  Service:            Current Outpatient Prescriptions:   •  aspirin (ASPIR) 81 MG EC tablet, Take  by mouth., Disp: , Rfl:   •  Calcium Carb-Cholecalciferol (CALCIUM 600 + D) 600-200 MG-UNIT tablet, Take  by mouth., Disp: , Rfl:   •  Cholecalciferol (VITAMIN D3) 2000 UNITS capsule, Take  by mouth., Disp: , Rfl:   •  metoprolol tartrate (LOPRESSOR) 25 MG tablet, Take 25 mg by mouth 2 (Two) Times a Day., Disp: , Rfl:   •  Omega-3 Fatty Acids (FISH OIL) 1000 MG capsule capsule, Take  by mouth., Disp: , Rfl:   •  RESTASIS 0.05 % ophthalmic emulsion, , Disp: , Rfl:   •  simvastatin (ZOCOR) 20 MG tablet, TAKE ONE TABLET BY MOUTH EVERY NIGHT AT BEDTIME, Disp: 30 tablet, Rfl: 11  •  valsartan-hydrochlorothiazide (DIOVAN HCT) 320-12.5 MG per tablet, Take 1 tablet by mouth Daily., Disp: 90 tablet, Rfl: 3      Social History     Social History   • Marital status: Single     Spouse name: N/A   • Number of children: N/A   • Years of education: N/A     Occupational History   • Not on file.     Social History Main Topics   • Smoking status: Never Smoker   • Smokeless tobacco: Not on file   • Alcohol use No   • Drug use: No   • Sexual activity: Defer     Other Topics Concern   • Not on file     Social History Narrative         Review of Systems   Constitution: Negative.   HENT: Negative.    Eyes: Negative.   "  Cardiovascular: Negative.    Respiratory: Negative.    Endocrine: Negative.    Skin: Negative.    Musculoskeletal: Negative.    Gastrointestinal: Negative.    Neurological: Negative.    Psychiatric/Behavioral: Negative.        Procedures    Procedures       Objective:      Visit Vitals   • /72   • Pulse 59   • Ht 65\" (165.1 cm)   • Wt 132 lb (59.9 kg)   • BMI 21.97 kg/m2           Physical Exam   Constitutional: She is oriented to person, place, and time. She appears well-developed and well-nourished.   Incision healing well   Neck: No JVD present. Carotid bruit is not present. No thyromegaly present.   Cardiovascular: Normal rate, regular rhythm, S1 normal, S2 normal, normal heart sounds and intact distal pulses.  Exam reveals no gallop and no friction rub.    No murmur heard.  Pulmonary/Chest: Effort normal and breath sounds normal.   Neurological: She is alert and oriented to person, place, and time.   Skin: Skin is intact.   Vitals reviewed.          Assessment:       Diagnosis Plan   1. Sick sinus syndrome              Plan:     Follow back up in one year    "

## 2017-05-10 ENCOUNTER — CLINICAL SUPPORT NO REQUIREMENTS (OUTPATIENT)
Dept: CARDIOLOGY | Facility: CLINIC | Age: 82
End: 2017-05-10

## 2017-05-10 DIAGNOSIS — I49.5 SINOATRIAL NODE DYSFUNCTION (HCC): Primary | ICD-10-CM

## 2017-05-10 PROCEDURE — 93280 PM DEVICE PROGR EVAL DUAL: CPT | Performed by: INTERNAL MEDICINE

## 2017-06-12 ENCOUNTER — LAB (OUTPATIENT)
Dept: INTERNAL MEDICINE | Facility: CLINIC | Age: 82
End: 2017-06-12

## 2017-06-12 DIAGNOSIS — E78.00 PURE HYPERCHOLESTEROLEMIA: ICD-10-CM

## 2017-06-12 DIAGNOSIS — I10 BENIGN ESSENTIAL HYPERTENSION: ICD-10-CM

## 2017-06-12 LAB
BACTERIA UR QL AUTO: ABNORMAL /HPF
BILIRUB UR QL STRIP: NEGATIVE
CLARITY UR: CLEAR
COLOR UR: YELLOW
GLUCOSE UR STRIP-MCNC: NEGATIVE MG/DL
HGB UR QL STRIP.AUTO: NEGATIVE
HYALINE CASTS UR QL AUTO: ABNORMAL /LPF
KETONES UR QL STRIP: NEGATIVE
LEUKOCYTE ESTERASE UR QL STRIP.AUTO: ABNORMAL
NITRITE UR QL STRIP: NEGATIVE
PH UR STRIP.AUTO: 5.5 [PH] (ref 5–8)
PROT UR QL STRIP: NEGATIVE
RBC # UR: ABNORMAL /HPF
REF LAB TEST METHOD: ABNORMAL
SP GR UR STRIP: 1.02 (ref 1–1.03)
SQUAMOUS #/AREA URNS HPF: ABNORMAL /HPF
UROBILINOGEN UR QL STRIP: ABNORMAL
WBC UR QL AUTO: ABNORMAL /HPF

## 2017-06-12 PROCEDURE — 81001 URINALYSIS AUTO W/SCOPE: CPT | Performed by: INTERNAL MEDICINE

## 2017-06-13 LAB
ALBUMIN SERPL-MCNC: 4.2 G/DL (ref 3.5–4.7)
ALBUMIN/GLOB SERPL: 1.8 {RATIO} (ref 1.2–2.2)
ALP SERPL-CCNC: 75 IU/L (ref 39–117)
ALT SERPL-CCNC: 9 IU/L (ref 0–32)
AST SERPL-CCNC: 21 IU/L (ref 0–40)
BILIRUB SERPL-MCNC: 0.4 MG/DL (ref 0–1.2)
BUN SERPL-MCNC: 28 MG/DL (ref 8–27)
BUN/CREAT SERPL: 25 (ref 12–28)
CALCIUM SERPL-MCNC: 9.7 MG/DL (ref 8.7–10.3)
CHLORIDE SERPL-SCNC: 101 MMOL/L (ref 96–106)
CHOLEST SERPL-MCNC: 171 MG/DL (ref 100–199)
CO2 SERPL-SCNC: 24 MMOL/L (ref 18–29)
CREAT SERPL-MCNC: 1.11 MG/DL (ref 0.57–1)
GLOBULIN SER CALC-MCNC: 2.3 G/DL (ref 1.5–4.5)
GLUCOSE SERPL-MCNC: 104 MG/DL (ref 65–99)
HDLC SERPL-MCNC: 82 MG/DL
LDLC SERPL CALC-MCNC: 74 MG/DL (ref 0–99)
POTASSIUM SERPL-SCNC: 4.3 MMOL/L (ref 3.5–5.2)
PROT SERPL-MCNC: 6.5 G/DL (ref 6–8.5)
SODIUM SERPL-SCNC: 142 MMOL/L (ref 134–144)
TRIGL SERPL-MCNC: 73 MG/DL (ref 0–149)
TSH SERPL-ACNC: 1.7 UIU/ML (ref 0.45–4.5)
VLDLC SERPL-MCNC: 15 MG/DL (ref 5–40)

## 2017-06-16 ENCOUNTER — OFFICE VISIT (OUTPATIENT)
Dept: INTERNAL MEDICINE | Facility: CLINIC | Age: 82
End: 2017-06-16

## 2017-06-16 VITALS
HEIGHT: 65 IN | DIASTOLIC BLOOD PRESSURE: 74 MMHG | WEIGHT: 141 LBS | SYSTOLIC BLOOD PRESSURE: 144 MMHG | BODY MASS INDEX: 23.49 KG/M2

## 2017-06-16 DIAGNOSIS — E78.00 PURE HYPERCHOLESTEROLEMIA: ICD-10-CM

## 2017-06-16 DIAGNOSIS — I10 BENIGN ESSENTIAL HYPERTENSION: ICD-10-CM

## 2017-06-16 DIAGNOSIS — Z86.010 HISTORY OF ADENOMATOUS POLYP OF COLON: ICD-10-CM

## 2017-06-16 DIAGNOSIS — Z00.00 HEALTH CARE MAINTENANCE: Primary | ICD-10-CM

## 2017-06-16 DIAGNOSIS — R73.01 IMPAIRED FASTING GLUCOSE: ICD-10-CM

## 2017-06-16 DIAGNOSIS — Z78.0 POST-MENOPAUSE: ICD-10-CM

## 2017-06-16 PROBLEM — Z86.0101 HISTORY OF ADENOMATOUS POLYP OF COLON: Status: ACTIVE | Noted: 2017-06-16

## 2017-06-16 PROCEDURE — 99214 OFFICE O/P EST MOD 30 MIN: CPT | Performed by: INTERNAL MEDICINE

## 2017-06-16 PROCEDURE — G0439 PPPS, SUBSEQ VISIT: HCPCS | Performed by: INTERNAL MEDICINE

## 2017-06-16 RX ORDER — SIMVASTATIN 20 MG
20 TABLET ORAL NIGHTLY
Qty: 90 TABLET | Refills: 3 | Status: SHIPPED | OUTPATIENT
Start: 2017-06-16 | End: 2018-07-12

## 2017-06-16 RX ORDER — SIMVASTATIN 20 MG
20 TABLET ORAL NIGHTLY
Qty: 90 TABLET | Refills: 3 | Status: SHIPPED | OUTPATIENT
Start: 2017-06-16 | End: 2017-06-16 | Stop reason: SDUPTHER

## 2017-06-16 NOTE — PROGRESS NOTES
"Subjective   Alexandria Alcocer is a 87 y.o. female. Here for AWV, also HTN and hyperlipidemia f/u    History of Present Illness   /74  Ht 65\" (165.1 cm)  Wt 141 lb (64 kg)  BMI 23.46 kg/m2  Patient is being seen for subsequent wellness visit.  Hospitalizations in a past: once, for the pacemaker placement 2016  Once per lifetime Medicare screening tests: ECG - 02/07/2011, nl    AAA risk assessment: 1. FH: none. 2.H/o tobacco smoking: none. 3. CV or PAD: as per medical problems list.    Tobacco use: none   Alcohol use: none  Illicit drugs use: none  Diet: well balanced  Exercise: none    Anxiety screening: How many days in the last 2 weeks you were  1. Feeling anxious, nervous, on edge: none  2. Unable to stop worrying: none  3. Worrying too much about different things: none  4. Having problems relaxing:none  5. Feeling restless or unable to sit still:none  6. Feeling irritable or easely annoyed: none  7. Being afraid that something awful might happen: none    Depression screening PHQ9:  Over the past 2 weeks how often have you been bothered by  1. Lack of interest or pleasuer in usual activities: none  2. Feeling down, depressed, hopeless:none  3. Troubles falling asleep/sleeping too long:none  4. Feeling tired, having little energy: none  5. Poor appetite or overeating: none  6. Feeling bad about yourself:none.  7. Trouble concentrating: at the baseline.  8. Moving or speaking too slow or much faster than usual: none  9. Thoughts about harming yourself:none.    Cognitive impairment screening:   Do you have difficulties with:  1. Language: no  2. Behavior: no  3. Learning/retaining new information: no  4. Handling complex tasks: no  5. Reasoning: no  6. Spacial ability and orientation: no    Hearing: normal.  Driving: unrestricted  ADL: independent  ADL details: Does patient needs help with:  telephone use: no  Transportation: no  Housework: no  Shopping: no  meal preparation: no  laundry: no  managing " "medication:no  Participate in the social activities: Y    Fall risk factors:   1.Use of alcohol: no    2.Polypharmacy: no  3.H/o of previous fall:  no  4. Mobility impairment:  no  5. Visual impairment:  no  6. Deconditioning: yes  7. Use of antihypertensive medication:  yes  8. Use of sedatives: no  9. Use of antidepressant: no    Home safety:   1.loose rugs: no   2.unfamiliar environment: no   3. Uneven floors: no   4. No grab bars in the bathroom:  yes  5. No banister on stairs: no      Advanced directives: has Living Will. Discussed. I agree with patient wishes and decision..    Co-managers: ophthalmology , dermatology , ortho , gastroenterologist       /74  Ht 65\" (165.1 cm)  Wt 141 lb (64 kg)  BMI 23.46 kg/m2    Current Outpatient Prescriptions:   •  aspirin (ASPIR) 81 MG EC tablet, Take  by mouth., Disp: , Rfl:   •  Calcium Carb-Cholecalciferol (CALCIUM 600 + D) 600-200 MG-UNIT tablet, Take  by mouth., Disp: , Rfl:   •  Cholecalciferol (VITAMIN D3) 2000 UNITS capsule, Take  by mouth., Disp: , Rfl:   •  metoprolol tartrate (LOPRESSOR) 25 MG tablet, Take 25 mg by mouth 2 (Two) Times a Day., Disp: , Rfl:   •  Omega-3 Fatty Acids (FISH OIL) 1000 MG capsule capsule, Take  by mouth., Disp: , Rfl:   •  RESTASIS 0.05 % ophthalmic emulsion, , Disp: , Rfl:   •  simvastatin (ZOCOR) 20 MG tablet, TAKE ONE TABLET BY MOUTH EVERY NIGHT AT BEDTIME, Disp: 30 tablet, Rfl: 11  •  valsartan-hydrochlorothiazide (DIOVAN HCT) 320-12.5 MG per tablet, Take 1 tablet by mouth Daily., Disp: 90 tablet, Rfl: 3    Patient has long-standing benign essential HTN.  BP is usually well controlled with daily use of b-blocker, thiazide diuretic and ARB. On low salt diet with good compliance. Takes medication regularly. Denies chest pain, dyspnea,lightheadedness,  lower extremity edema. Patient does not check blood pressure    Patient has been diagnosed with hyperlipidemia. Target LDL is < 100 mg/dl (CHD or " "\"CHD risk equivalent\" is present). Patient is on low fat diet with good compliance. Patient is compliant with treatment: daily use of Zocor (simvastatin). Side effects of medication: none. Exercise none.    In a past patient had been  noticed to have elevated fasting blood sugars. Advised on diet and exercise. Stable weight, admits having \"sweets tooth\". Her brother has DM. No exercise.                                  The following portions of the patient's history were reviewed and updated as appropriate: allergies, current medications, past family history, past medical history, past social history, past surgical history and problem list.    Review of Systems   Constitutional: Negative for chills and fever.   HENT: Negative for postnasal drip, sinus pressure and sore throat.    Eyes: Negative for pain and itching.   Respiratory: Negative for cough and chest tightness.    Cardiovascular: Positive for leg swelling (ankles). Negative for chest pain.   Gastrointestinal: Negative for abdominal pain and blood in stool.   Endocrine: Negative for cold intolerance and heat intolerance.   Genitourinary: Negative for difficulty urinating and flank pain.   Musculoskeletal: Positive for back pain and neck pain.   Skin: Negative for color change and rash.   Neurological: Negative for dizziness, weakness and headaches.   Hematological: Negative for adenopathy. Does not bruise/bleed easily.   Psychiatric/Behavioral: Negative for sleep disturbance. The patient is not nervous/anxious.        Objective   Physical Exam   Constitutional: She is oriented to person, place, and time. Vital signs are normal. She appears well-developed and well-nourished. No distress.   HENT:   Head: Normocephalic and atraumatic.   Right Ear: External ear normal.   Left Ear: External ear normal.   Nose: Nose normal. No mucosal edema. Right sinus exhibits no maxillary sinus tenderness and no frontal sinus tenderness. Left sinus exhibits no maxillary sinus " tenderness and no frontal sinus tenderness.   Mouth/Throat: Oropharynx is clear and moist. No oropharyngeal exudate.   Eyes: Conjunctivae, EOM and lids are normal. Pupils are equal, round, and reactive to light. Right eye exhibits no discharge. Left eye exhibits no discharge. Right conjunctiva is not injected. Left conjunctiva is not injected. No scleral icterus. Right eye exhibits normal extraocular motion. Left eye exhibits normal extraocular motion.   Neck: Normal range of motion and full passive range of motion without pain. Neck supple. No JVD present. Carotid bruit is not present. No thyromegaly present.   Cardiovascular: Normal rate, regular rhythm, S1 normal, S2 normal, normal heart sounds and intact distal pulses.  PMI is not displaced.  Exam reveals no gallop and no friction rub.    No murmur heard.  Pulmonary/Chest: Effort normal and breath sounds normal. No accessory muscle usage. No respiratory distress. She has no decreased breath sounds. She has no wheezes. She has no rhonchi. She has no rales.   Abdominal: Soft. Bowel sounds are normal. She exhibits no distension, no pulsatile liver, no fluid wave, no abdominal bruit, no ascites and no mass. There is no tenderness. There is no rebound and no guarding.   Musculoskeletal: She exhibits edema (trace edema left ankle) and deformity (osteoarthritic changes both hands).   Lymphadenopathy:        Head (right side): No submental, no submandibular, no preauricular, no posterior auricular and no occipital adenopathy present.        Head (left side): No submental, no submandibular, no tonsillar, no preauricular, no posterior auricular and no occipital adenopathy present.     She has no cervical adenopathy.        Right cervical: No superficial cervical, no deep cervical and no posterior cervical adenopathy present.       Left cervical: No superficial cervical, no deep cervical and no posterior cervical adenopathy present.        Right: No supraclavicular  adenopathy present.        Left: No supraclavicular adenopathy present.   Neurological: She is alert and oriented to person, place, and time. She has normal strength and normal reflexes. She displays normal reflexes. No cranial nerve deficit. She exhibits normal muscle tone. Coordination normal.   Reflex Scores:       Bicep reflexes are 2+ on the right side and 2+ on the left side.       Patellar reflexes are 2+ on the right side and 2+ on the left side.  Skin: Skin is warm and dry. No rash noted. She is not diaphoretic. No erythema.   Psychiatric: She has a normal mood and affect. Her speech is normal and behavior is normal. Thought content normal.   Vitals reviewed.      Assessment/Plan   Alexandria was seen today for annual exam, hypertension and hyperlipidemia.    Diagnoses and all orders for this visit:    Health care maintenance    Pure hypercholesterolemia    Benign essential hypertension      Annual wellness visit with preventive exam as well as age and risk appropriate councelling completed.    Cardiovascular disease councelling: current. Continue Simvastatin and ASA.  Diabetes screening and councelling: current. Needs to avoid starches and sweets in the diet. Will monitor BSs.  Breast cancer screening: up to date. Recommended every year..  Osteoporosis screening: is due, will schedule. Benefits explained..  Glaucoma screening: up to date.   AAA screening: not needed..  Hep C screening (born between [birth year]) not needed, patient is not in the age group, and has no risk factors..  Colorectal cancer screening: up to date. Recommended every 5 years. Next screening is recommended in 2016, patient is aaware, but states that she doesn't have time now, as there is a lot going on in her family, will call  when has amirah free time...    Immunizations:   1. Influenza vaccine  is up to date and recommended yearly.   2. Pneumococcal vaccines: completed.   3. Zostavax: discussed and recommended to get at  "[facility].      Advanced directives planning: has Living Will. Discussed. I agree with patient wishes and decision..    Medications reviewed and medication list updated.   Potential harmful drug-disease interactions in the elderly:none.  High risk medication in elderly: none  ASA use: continue daily ASA 81 mg.    HTN - well controlled with current medication regimen. Normal kidney tests and electrolytes. Recommended low salt diet. Continue same medical treatment.  Hyperlipidemia - well controlled with statin. Normal liver function tests. LDL target is below < 100 mg/dl (CHD or \"CHD risk equivalent\" is present). Patient's 74. Continue same treatment. Regular exercise recommended.  H/o adenomatous colon polyp - patient is aware that she needs C-scope, but states that has no time now. Will call  when her schedule is a bit lighter.  Impaired fasting blood sugar - still an issue. There is FH of diabetes: brother. Advised to stay away from starches and sweets.  "

## 2017-06-16 NOTE — PATIENT INSTRUCTIONS
"Annual wellness visit with preventive exam as well as age and risk appropriate councelling completed.    Cardiovascular disease councelling: current. Continue Simvastatin and ASA.  Diabetes screening and councelling: current. Needs to avoid starches and sweets in the diet. Will monitor BSs.  Breast cancer screening: up to date. Recommended every year..  Osteoporosis screening: is due, will schedule. Benefits explained..  Glaucoma screening: up to date.   AAA screening: not needed..  Hep C screening (born between [birth year]) not needed, patient is not in the age group, and has no risk factors..  Colorectal cancer screening: up to date. Recommended every 5 years. Next screening is recommended in 2016, patient is aaware, but states that she doesn't have time now, as there is a lot going on in her family, will call  when has amirah free time...    Immunizations:   1. Influenza vaccine  is up to date and recommended yearly.   2. Pneumococcal vaccines: completed.   3. Zostavax: discussed and recommended to get at [facility].      Advanced directives planning: has Living Will. Discussed. I agree with patient wishes and decision..    Medications reviewed and medication list updated.   Potential harmful drug-disease interactions in the elderly:none.  High risk medication in elderly: none  ASA use: continue daily ASA 81 mg.    HTN - well controlled with current medication regimen. Normal kidney tests and electrolytes. Recommended low salt diet. Continue same medical treatment.  Hyperlipidemia - well controlled with statin. Normal liver function tests. LDL target is below < 100 mg/dl (CHD or \"CHD risk equivalent\" is present). Patient's 74. Continue same treatment. Regular exercise recommended.  H/o adenomatous colon polyp - patient is aware that she needs C-scope, but states that has no time now. Will call  when her schedule is a bit lighter.  Impaired fasting blood sugar - still an issue. There is FH of diabetes: " brother. Advised to stay away from starches and sweets.  Return in about 6 months (around 12/16/2017) for HTN, cholest, BS.

## 2017-07-22 DIAGNOSIS — I10 BENIGN ESSENTIAL HYPERTENSION: ICD-10-CM

## 2017-07-24 RX ORDER — VALSARTAN AND HYDROCHLOROTHIAZIDE 320; 12.5 MG/1; MG/1
TABLET, FILM COATED ORAL
Qty: 90 TABLET | Refills: 1 | Status: SHIPPED | OUTPATIENT
Start: 2017-07-24 | End: 2018-02-20 | Stop reason: SDUPTHER

## 2017-09-06 ENCOUNTER — TELEPHONE (OUTPATIENT)
Dept: INTERNAL MEDICINE | Facility: CLINIC | Age: 82
End: 2017-09-06

## 2017-09-06 DIAGNOSIS — Z12.31 VISIT FOR SCREENING MAMMOGRAM: Primary | ICD-10-CM

## 2017-09-06 NOTE — TELEPHONE ENCOUNTER
----- Message from Alea Weathers sent at 9/6/2017  2:15 PM EDT -----  Contact: pt - Dr Schmidt's pt - RE: mammogram order  Pt calling and would like an order for a mammogram to be put in pt's chart. Pt informs has mammograms done @ [facility]. Could you please put order in chart? Please advise. Thanks    Pt # [phone]    Jerry's fax#  [phone]

## 2017-09-07 ENCOUNTER — CLINICAL SUPPORT NO REQUIREMENTS (OUTPATIENT)
Dept: CARDIOLOGY | Facility: CLINIC | Age: 82
End: 2017-09-07

## 2017-09-07 DIAGNOSIS — I49.5 SICK SINUS SYNDROME (HCC): Primary | ICD-10-CM

## 2017-09-07 PROCEDURE — 93296 REM INTERROG EVL PM/IDS: CPT | Performed by: INTERNAL MEDICINE

## 2017-09-07 PROCEDURE — 93294 REM INTERROG EVL PM/LDLS PM: CPT | Performed by: INTERNAL MEDICINE

## 2017-12-08 ENCOUNTER — CLINICAL SUPPORT NO REQUIREMENTS (OUTPATIENT)
Dept: CARDIOLOGY | Facility: CLINIC | Age: 82
End: 2017-12-08

## 2017-12-08 DIAGNOSIS — I49.5 SICK SINUS SYNDROME (HCC): Primary | ICD-10-CM

## 2017-12-08 PROCEDURE — 93294 REM INTERROG EVL PM/LDLS PM: CPT | Performed by: INTERNAL MEDICINE

## 2017-12-08 PROCEDURE — 93296 REM INTERROG EVL PM/IDS: CPT | Performed by: INTERNAL MEDICINE

## 2017-12-19 ENCOUNTER — LAB (OUTPATIENT)
Dept: INTERNAL MEDICINE | Facility: CLINIC | Age: 82
End: 2017-12-19

## 2017-12-19 DIAGNOSIS — E78.00 PURE HYPERCHOLESTEROLEMIA: ICD-10-CM

## 2017-12-19 LAB
ALBUMIN SERPL-MCNC: 4 G/DL (ref 3.5–5.2)
ALBUMIN/GLOB SERPL: 1.4 G/DL
ALP SERPL-CCNC: 75 U/L (ref 39–117)
ALT SERPL W P-5'-P-CCNC: 15 U/L (ref 1–33)
ANION GAP SERPL CALCULATED.3IONS-SCNC: 11.4 MMOL/L
AST SERPL-CCNC: 21 U/L (ref 1–32)
BILIRUB SERPL-MCNC: 0.6 MG/DL (ref 0.1–1.2)
BUN BLD-MCNC: 21 MG/DL (ref 8–23)
BUN/CREAT SERPL: 19.3 (ref 7–25)
CALCIUM SPEC-SCNC: 9.5 MG/DL (ref 8.6–10.5)
CHLORIDE SERPL-SCNC: 103 MMOL/L (ref 98–107)
CHOLEST SERPL-MCNC: 152 MG/DL (ref 0–200)
CO2 SERPL-SCNC: 27.6 MMOL/L (ref 22–29)
CREAT BLD-MCNC: 1.09 MG/DL (ref 0.57–1)
GFR SERPL CREATININE-BSD FRML MDRD: 47 ML/MIN/1.73
GLOBULIN UR ELPH-MCNC: 2.9 GM/DL
GLUCOSE BLD-MCNC: 104 MG/DL (ref 65–99)
HDLC SERPL-MCNC: 90 MG/DL (ref 40–60)
LDLC SERPL CALC-MCNC: 47 MG/DL (ref 0–100)
LDLC/HDLC SERPL: 0.52 {RATIO}
POTASSIUM BLD-SCNC: 4 MMOL/L (ref 3.5–5.2)
PROT SERPL-MCNC: 6.9 G/DL (ref 6–8.5)
SODIUM BLD-SCNC: 142 MMOL/L (ref 136–145)
TRIGL SERPL-MCNC: 76 MG/DL (ref 0–150)
VLDLC SERPL-MCNC: 15.2 MG/DL (ref 5–40)

## 2017-12-19 PROCEDURE — 80061 LIPID PANEL: CPT | Performed by: INTERNAL MEDICINE

## 2017-12-19 PROCEDURE — 80053 COMPREHEN METABOLIC PANEL: CPT | Performed by: INTERNAL MEDICINE

## 2017-12-19 PROCEDURE — 36415 COLL VENOUS BLD VENIPUNCTURE: CPT | Performed by: INTERNAL MEDICINE

## 2017-12-27 ENCOUNTER — OFFICE VISIT (OUTPATIENT)
Dept: INTERNAL MEDICINE | Facility: CLINIC | Age: 82
End: 2017-12-27

## 2017-12-27 VITALS
DIASTOLIC BLOOD PRESSURE: 72 MMHG | SYSTOLIC BLOOD PRESSURE: 134 MMHG | BODY MASS INDEX: 23.66 KG/M2 | RESPIRATION RATE: 14 BRPM | HEIGHT: 65 IN | WEIGHT: 142 LBS

## 2017-12-27 DIAGNOSIS — E78.00 PURE HYPERCHOLESTEROLEMIA: Primary | ICD-10-CM

## 2017-12-27 DIAGNOSIS — I10 BENIGN ESSENTIAL HYPERTENSION: ICD-10-CM

## 2017-12-27 DIAGNOSIS — R73.01 IMPAIRED FASTING GLUCOSE: ICD-10-CM

## 2017-12-27 PROCEDURE — 99214 OFFICE O/P EST MOD 30 MIN: CPT | Performed by: INTERNAL MEDICINE

## 2017-12-27 NOTE — PROGRESS NOTES
"Subjective   Alexandria Alcocer is a 87 y.o. female.     History of Present Illness     /72 (BP Location: Left arm, Patient Position: Sitting, Cuff Size: Adult)  Resp 14  Ht 165.1 cm (65\")  Wt 64.4 kg (142 lb)  BMI 23.63 kg/m2    Current Outpatient Prescriptions:   •  aspirin (ASPIR) 81 MG EC tablet, Take  by mouth., Disp: , Rfl:   •  Calcium Carb-Cholecalciferol (CALCIUM 600 + D) 600-200 MG-UNIT tablet, Take  by mouth., Disp: , Rfl:   •  Cholecalciferol (VITAMIN D3) 2000 UNITS capsule, Take  by mouth., Disp: , Rfl:   •  metoprolol tartrate (LOPRESSOR) 25 MG tablet, Take 1 tablet by mouth 2 (Two) Times a Day., Disp: 180 tablet, Rfl: 3  •  Omega-3 Fatty Acids (FISH OIL) 1000 MG capsule capsule, Take  by mouth., Disp: , Rfl:   •  simvastatin (ZOCOR) 20 MG tablet, Take 1 tablet by mouth Every Night., Disp: 90 tablet, Rfl: 3  •  valsartan-hydrochlorothiazide (DIOVAN-HCT) 320-12.5 MG per tablet, Take 1 tablet by mouth  daily, Disp: 90 tablet, Rfl: 1  •  RESTASIS 0.05 % ophthalmic emulsion, , Disp: , Rfl:     Patient has long-standing benign essential HTN.  BP is usually well controlled with daily use of thiazide diuretic and ARB. On low salt diet with good compliance. Takes medication regularly. Denies chest pain, dyspnea,lightheadedness,  lower extremity edema. Patient does not check blood pressure    Patient has been diagnosed with hyperlipidemia. Target LDL is < 70 mg/dl (\"very high\" risk for CHD). Patient is on low fat diet with good compliance. Patient is compliant with treatment: daily use of Zocor (simvastatin). Side effects of medication: none. Exercise none.    Patient had been  Noticed to have elevated fasting blood sugars. Alexandria Alcocer uses diet alone for blood sugars control. Patient does not check home blood sugars.. Compliance with low carb diabetic diet is good.     The following portions of the patient's history were reviewed and updated as appropriate: allergies, current medications, past family " history, past medical history, past social history, past surgical history and problem list.    Review of Systems   Constitutional: Negative for chills and fever.   Eyes: Negative for pain and redness.   Respiratory: Negative for cough and shortness of breath.    Cardiovascular: Negative for chest pain and leg swelling.   Neurological: Negative for dizziness and headaches.       Objective   Physical Exam   Constitutional: She is oriented to person, place, and time. She appears well-developed and well-nourished.   HENT:   Head: Normocephalic and atraumatic.   Right Ear: Tympanic membrane, external ear and ear canal normal.   Left Ear: Tympanic membrane, external ear and ear canal normal.   Nose: Nose normal. Right sinus exhibits no maxillary sinus tenderness and no frontal sinus tenderness. Left sinus exhibits no maxillary sinus tenderness and no frontal sinus tenderness.   Mouth/Throat: Uvula is midline, oropharynx is clear and moist and mucous membranes are normal.   Eyes: Conjunctivae and EOM are normal. Pupils are equal, round, and reactive to light. Right eye exhibits no discharge. Left eye exhibits no discharge. No scleral icterus.   Neck: Neck supple. No JVD present.   Cardiovascular: Normal rate, regular rhythm and normal heart sounds.  Exam reveals no gallop and no friction rub.    No murmur heard.  Pulmonary/Chest: Effort normal and breath sounds normal. She has no wheezes. She has no rales.   Musculoskeletal: She exhibits no edema.   Lymphadenopathy:     She has no cervical adenopathy.   Neurological: She is alert and oriented to person, place, and time. No cranial nerve deficit.   Skin: Skin is warm and dry. No rash noted.   Psychiatric: She has a normal mood and affect. Her behavior is normal.   Vitals reviewed.      Assessment/Plan   Alexandria was seen today for hypertension, blood sugar and hyperlipidemia.    Diagnoses and all orders for this visit:    Pure hypercholesterolemia  -     Comprehensive  "Metabolic Panel; Future  -     Lipid Panel; Future  -     TSH; Future  -     CBC & Differential; Future    Benign essential hypertension  -     Comprehensive Metabolic Panel; Future  -     Urinalysis With / Microscopic If Indicated - Urine, Clean Catch; Future    Impaired fasting glucose    HTN - well controlled with current medication regimen. Normal kidney tests and electrolytes. Recommended low salt diet. Continue same medical treatment.  Hyperlipidemia - well controlled with statin. Normal liver function tests. LDL target is below < 70 mg/dl (\"very high\" risk for CHD). Patient's 40. Continue same treatment. Regular exercise recommended.  Impaired fasting blood sugar - stable at 104 (has to be below 100) - Low carb diet and regular exercise recommended.   "

## 2018-02-20 DIAGNOSIS — I10 BENIGN ESSENTIAL HYPERTENSION: ICD-10-CM

## 2018-02-20 RX ORDER — VALSARTAN AND HYDROCHLOROTHIAZIDE 320; 12.5 MG/1; MG/1
TABLET, FILM COATED ORAL
Qty: 90 TABLET | Refills: 1 | Status: SHIPPED | OUTPATIENT
Start: 2018-02-20 | End: 2018-08-27 | Stop reason: SDUPTHER

## 2018-03-08 ENCOUNTER — CLINICAL SUPPORT NO REQUIREMENTS (OUTPATIENT)
Dept: CARDIOLOGY | Facility: CLINIC | Age: 83
End: 2018-03-08

## 2018-03-08 ENCOUNTER — OFFICE VISIT (OUTPATIENT)
Dept: CARDIOLOGY | Facility: CLINIC | Age: 83
End: 2018-03-08

## 2018-03-08 VITALS
SYSTOLIC BLOOD PRESSURE: 162 MMHG | DIASTOLIC BLOOD PRESSURE: 72 MMHG | HEART RATE: 60 BPM | HEIGHT: 65 IN | WEIGHT: 148 LBS | BODY MASS INDEX: 24.66 KG/M2

## 2018-03-08 DIAGNOSIS — I49.5 SICK SINUS SYNDROME (HCC): Primary | ICD-10-CM

## 2018-03-08 DIAGNOSIS — I10 BENIGN ESSENTIAL HYPERTENSION: ICD-10-CM

## 2018-03-08 PROCEDURE — 99213 OFFICE O/P EST LOW 20 MIN: CPT | Performed by: INTERNAL MEDICINE

## 2018-03-08 PROCEDURE — 93280 PM DEVICE PROGR EVAL DUAL: CPT | Performed by: INTERNAL MEDICINE

## 2018-03-08 NOTE — PROGRESS NOTES
Date of Office Visit: 2018  Encounter Provider: Liban Suazo MD  Place of Service: [facility]  Patient Name: Alexandria Alcocer  :3/8/1930      Chief Complaint   Patient presents with   • Sick sinus syndrome     History of Present Illness    The patient is an 87-year-old white female with a history of sick sinus syndrome.  She is status post permanent pacemaker implant today.  She returns to the office today feeling well.  He has to have any complaints from a cardiac standpoint.  Occasional she'll notice some fatigue issues but she does not complain of chest pain, shortness of breath or palpitations.  Her pacemaker was checked today with normal function.  No significant arrhythmias were detected.    Past Medical History:   Diagnosis Date   • Arthritis    • Bell's palsy    • Cataract    • Cholelithiasis      s/p cholecystectomy ,    • Degenerative disc disease, thoracic    • Esophageal reflux     10/08 Dr Fuller   • Esophagitis     EDG 10/08 Dr Fuller   • H/O Doppler ultrasound of artery carotid     nl , has a plaque without stenosis in the R bifurcation ICA   • H/O echocardiogram 2D     2009: EF 60%, mild MR, mild TR, PA pressure 38, diastolic dysfunction 2014: EF 55%, grade I diast dysf-n   • Hemorrhoids    • Herpes zoster     2011   • Hypercholesterolemia    • Hyperglycemia    • Lightheadedness 10/7/2016   • Lung nodule seen on imaging study     none seen on the x ray 2014   • Mammogram abnormal      had normal f/u evaluation normal  [facility] 7/25/15   • Near syncope 2016   • Neuralgia, postherpetic    • Occupational bursitis     right shoulder   • Osteopenia     Dexa 2011   • Postherpetic polyneuropathy    • Tubular adenoma of colon      . 10/08, 3 polyps with mild dysplasia,  2 polyps transverse colon, also had hyperplastic polyp in    • Vasovagal syncope        • Visit for screening mammogram      nl 06/18/13, 07/2014 [facility]         Past Surgical History:   Procedure Laterality Date   • BRONCHOSCOPY      1996, bronchoscopy with Bx - inflammatory lung nodule   • CARDIAC ELECTROPHYSIOLOGY PROCEDURE N/A 2/6/2017    Procedure: Pacemaker DC new, [employer];  Surgeon: Liban Suazo MD;  Location: [facility] INVASIVE LOCATION;  Service:    • CATARACT EXTRACTION     • CHOLECYSTECTOMY      Dr Ruiz   • COLONOSCOPY      · , 11/29/2011, tubular adenoma 3-5 mm x 2 in the transverse colon.   • INCISIONAL BREAST BIOPSY      1998 also had ducrogram due to D/C   • POLYPECTOMY     • TONSILLECTOMY     • TOTAL ABDOMINAL HYSTERECTOMY      with removal of both ovaries           Current Outpatient Prescriptions:   •  aspirin (ASPIR) 81 MG EC tablet, Take  by mouth., Disp: , Rfl:   •  Calcium Carb-Cholecalciferol (CALCIUM 600 + D) 600-200 MG-UNIT tablet, Take  by mouth., Disp: , Rfl:   •  Cholecalciferol (VITAMIN D3) 2000 UNITS capsule, Take  by mouth., Disp: , Rfl:   •  metoprolol tartrate (LOPRESSOR) 25 MG tablet, Take 1 tablet by mouth 2 (Two) Times a Day., Disp: 180 tablet, Rfl: 3  •  Omega-3 Fatty Acids (FISH OIL) 1000 MG capsule capsule, Take  by mouth., Disp: , Rfl:   •  RESTASIS 0.05 % ophthalmic emulsion, , Disp: , Rfl:   •  simvastatin (ZOCOR) 20 MG tablet, Take 1 tablet by mouth Every Night., Disp: 90 tablet, Rfl: 3  •  valsartan-hydrochlorothiazide (DIOVAN-HCT) 320-12.5 MG per tablet, TAKE 1 TABLET BY MOUTH  DAILY, Disp: 90 tablet, Rfl: 1      Social History     Social History   • Marital status: Single     Spouse name: N/A   • Number of children: N/A   • Years of education: N/A     Occupational History   • Not on file.     Social History Main Topics   • Smoking status: Never Smoker   • Smokeless tobacco: Not on file   • Alcohol use No   • Drug use: No   • Sexual activity: Defer     Other Topics Concern   • Not on file     Social History Narrative         Review of Systems   Constitution:  "Positive for malaise/fatigue.   HENT: Negative.    Eyes: Negative.    Cardiovascular: Negative.    Respiratory: Negative.    Endocrine: Negative.    Skin: Negative.    Musculoskeletal: Negative.    Gastrointestinal: Negative.    Neurological: Negative.    Psychiatric/Behavioral: Negative.        Procedures    Procedures       Objective:    /72  Pulse 60  Ht 165.1 cm (65\")  Wt 67.1 kg (148 lb)  BMI 24.63 kg/m2        Physical Exam   Constitutional: She is oriented to person, place, and time. She appears well-developed and well-nourished.   HENT:   Head: Normocephalic.   Eyes: Pupils are equal, round, and reactive to light.   Neck: Normal range of motion. No JVD present. Carotid bruit is not present. No thyromegaly present.   Cardiovascular: Normal rate, regular rhythm, S1 normal, S2 normal, normal heart sounds and intact distal pulses.  Exam reveals no gallop and no friction rub.    No murmur heard.  Pulmonary/Chest: Effort normal and breath sounds normal.   Abdominal: Soft. Bowel sounds are normal.   Musculoskeletal: She exhibits no edema.   Neurological: She is alert and oriented to person, place, and time.   Skin: Skin is warm, dry and intact. No erythema.   Psychiatric: She has a normal mood and affect.   Vitals reviewed.          Assessment:       Diagnosis Plan   1. Sick sinus syndrome     2. Benign essential hypertension       1.  Sick sinus syndrome: Normal pacemaker function  2.  Hypertension: Systolic pressure slightly elevated today.  Continue to monitor     Plan:         "

## 2018-06-12 ENCOUNTER — CLINICAL SUPPORT NO REQUIREMENTS (OUTPATIENT)
Dept: CARDIOLOGY | Facility: CLINIC | Age: 83
End: 2018-06-12

## 2018-06-12 DIAGNOSIS — I49.5 SICK SINUS SYNDROME (HCC): Primary | ICD-10-CM

## 2018-06-12 PROCEDURE — 93294 REM INTERROG EVL PM/LDLS PM: CPT | Performed by: INTERNAL MEDICINE

## 2018-06-12 PROCEDURE — 93296 REM INTERROG EVL PM/IDS: CPT | Performed by: INTERNAL MEDICINE

## 2018-06-25 ENCOUNTER — LAB (OUTPATIENT)
Dept: INTERNAL MEDICINE | Facility: CLINIC | Age: 83
End: 2018-06-25

## 2018-06-25 DIAGNOSIS — I10 BENIGN ESSENTIAL HYPERTENSION: ICD-10-CM

## 2018-06-25 DIAGNOSIS — E78.00 PURE HYPERCHOLESTEROLEMIA: ICD-10-CM

## 2018-06-25 LAB
ALBUMIN SERPL-MCNC: 4.2 G/DL (ref 3.5–5.2)
ALBUMIN/GLOB SERPL: 1.4 G/DL
ALP SERPL-CCNC: 76 U/L (ref 39–117)
ALT SERPL W P-5'-P-CCNC: 11 U/L (ref 1–33)
ANION GAP SERPL CALCULATED.3IONS-SCNC: 14.1 MMOL/L
AST SERPL-CCNC: 18 U/L (ref 1–32)
BACTERIA UR QL AUTO: ABNORMAL /HPF
BASOPHILS # BLD AUTO: 0.03 10*3/MM3 (ref 0–0.2)
BASOPHILS NFR BLD AUTO: 0.5 % (ref 0–2)
BILIRUB SERPL-MCNC: 0.5 MG/DL (ref 0.1–1.2)
BILIRUB UR QL CFM: NEGATIVE
BILIRUB UR QL STRIP: ABNORMAL
BUN BLD-MCNC: 28 MG/DL (ref 8–23)
BUN/CREAT SERPL: 19.9 (ref 7–25)
CALCIUM SPEC-SCNC: 9.8 MG/DL (ref 8.6–10.5)
CHLORIDE SERPL-SCNC: 104 MMOL/L (ref 98–107)
CHOLEST SERPL-MCNC: 135 MG/DL (ref 0–200)
CLARITY UR: CLEAR
CO2 SERPL-SCNC: 23.9 MMOL/L (ref 22–29)
COLOR UR: YELLOW
CREAT BLD-MCNC: 1.41 MG/DL (ref 0.57–1)
DEPRECATED RDW RBC AUTO: 42.4 FL (ref 37–54)
EOSINOPHIL # BLD AUTO: 0.05 10*3/MM3 (ref 0–0.7)
EOSINOPHIL NFR BLD AUTO: 0.9 % (ref 0–5)
ERYTHROCYTE [DISTWIDTH] IN BLOOD BY AUTOMATED COUNT: 13.2 % (ref 11.5–15)
GFR SERPL CREATININE-BSD FRML MDRD: 35 ML/MIN/1.73
GLOBULIN UR ELPH-MCNC: 2.9 GM/DL
GLUCOSE BLD-MCNC: 110 MG/DL (ref 65–99)
GLUCOSE UR STRIP-MCNC: NEGATIVE MG/DL
HCT VFR BLD AUTO: 38.7 % (ref 34.1–44.9)
HDLC SERPL-MCNC: 71 MG/DL (ref 40–60)
HGB BLD-MCNC: 13 G/DL (ref 11.2–15.7)
HGB UR QL STRIP.AUTO: NEGATIVE
HYALINE CASTS UR QL AUTO: ABNORMAL /LPF
KETONES UR QL STRIP: ABNORMAL
LDLC SERPL CALC-MCNC: 44 MG/DL (ref 0–100)
LDLC/HDLC SERPL: 0.62 {RATIO}
LEUKOCYTE ESTERASE UR QL STRIP.AUTO: ABNORMAL
LYMPHOCYTES # BLD AUTO: 1.52 10*3/MM3 (ref 0.8–7)
LYMPHOCYTES NFR BLD AUTO: 27.3 % (ref 10–60)
MCH RBC QN AUTO: 30.2 PG (ref 26–34)
MCHC RBC AUTO-ENTMCNC: 33.6 G/DL (ref 31–37)
MCV RBC AUTO: 90 FL (ref 80–100)
MONOCYTES # BLD AUTO: 0.43 10*3/MM3 (ref 0–1)
MONOCYTES NFR BLD AUTO: 7.7 % (ref 0–13)
MUCOUS THREADS URNS QL MICRO: ABNORMAL /HPF
NEUTROPHILS # BLD AUTO: 3.53 10*3/MM3 (ref 1–11)
NEUTROPHILS NFR BLD AUTO: 63.6 % (ref 30–85)
NITRITE UR QL STRIP: NEGATIVE
PH UR STRIP.AUTO: 5.5 [PH] (ref 5–8)
PLATELET # BLD AUTO: 184 10*3/MM3 (ref 150–450)
PMV BLD AUTO: 12 FL (ref 6–12)
POTASSIUM BLD-SCNC: 4.2 MMOL/L (ref 3.5–5.2)
PROT SERPL-MCNC: 7.1 G/DL (ref 6–8.5)
PROT UR QL STRIP: NEGATIVE
RBC # BLD AUTO: 4.3 10*6/MM3 (ref 3.93–5.22)
RBC # UR: ABNORMAL /HPF
REF LAB TEST METHOD: ABNORMAL
SODIUM BLD-SCNC: 142 MMOL/L (ref 136–145)
SP GR UR STRIP: >=1.03 (ref 1–1.03)
SQUAMOUS #/AREA URNS HPF: ABNORMAL /HPF
TRIGL SERPL-MCNC: 101 MG/DL (ref 0–150)
TSH SERPL-ACNC: 1.26 MIU/ML (ref 0.27–4.2)
UROBILINOGEN UR QL STRIP: ABNORMAL
VLDLC SERPL-MCNC: 20.2 MG/DL (ref 5–40)
WBC NRBC COR # BLD: 5.56 10*3/MM3 (ref 5–10)
WBC UR QL AUTO: ABNORMAL /HPF

## 2018-06-25 PROCEDURE — 80053 COMPREHEN METABOLIC PANEL: CPT | Performed by: INTERNAL MEDICINE

## 2018-06-25 PROCEDURE — 81001 URINALYSIS AUTO W/SCOPE: CPT | Performed by: INTERNAL MEDICINE

## 2018-06-25 PROCEDURE — 80061 LIPID PANEL: CPT | Performed by: INTERNAL MEDICINE

## 2018-06-25 PROCEDURE — 85025 COMPLETE CBC W/AUTO DIFF WBC: CPT | Performed by: INTERNAL MEDICINE

## 2018-07-09 DIAGNOSIS — I10 BENIGN ESSENTIAL HYPERTENSION: ICD-10-CM

## 2018-07-09 DIAGNOSIS — E78.00 PURE HYPERCHOLESTEROLEMIA: ICD-10-CM

## 2018-07-09 RX ORDER — SIMVASTATIN 20 MG
20 TABLET ORAL NIGHTLY
Qty: 90 TABLET | Refills: 1 | Status: SHIPPED | OUTPATIENT
Start: 2018-07-09 | End: 2018-11-26 | Stop reason: SDUPTHER

## 2018-07-12 ENCOUNTER — OFFICE VISIT (OUTPATIENT)
Dept: INTERNAL MEDICINE | Facility: CLINIC | Age: 83
End: 2018-07-12

## 2018-07-12 VITALS
BODY MASS INDEX: 25.2 KG/M2 | OXYGEN SATURATION: 98 % | TEMPERATURE: 96.7 F | HEART RATE: 72 BPM | RESPIRATION RATE: 16 BRPM | WEIGHT: 142.2 LBS | DIASTOLIC BLOOD PRESSURE: 74 MMHG | SYSTOLIC BLOOD PRESSURE: 138 MMHG | HEIGHT: 63 IN

## 2018-07-12 DIAGNOSIS — Z00.00 HEALTH CARE MAINTENANCE: Primary | ICD-10-CM

## 2018-07-12 DIAGNOSIS — E78.00 PURE HYPERCHOLESTEROLEMIA: ICD-10-CM

## 2018-07-12 DIAGNOSIS — Z12.31 VISIT FOR SCREENING MAMMOGRAM: ICD-10-CM

## 2018-07-12 DIAGNOSIS — R73.01 IMPAIRED FASTING GLUCOSE: ICD-10-CM

## 2018-07-12 DIAGNOSIS — I10 BENIGN ESSENTIAL HYPERTENSION: ICD-10-CM

## 2018-07-12 DIAGNOSIS — Z78.0 POST-MENOPAUSE: ICD-10-CM

## 2018-07-12 DIAGNOSIS — Z86.010 HISTORY OF ADENOMATOUS POLYP OF COLON: ICD-10-CM

## 2018-07-12 PROCEDURE — G0439 PPPS, SUBSEQ VISIT: HCPCS | Performed by: INTERNAL MEDICINE

## 2018-07-12 PROCEDURE — 99214 OFFICE O/P EST MOD 30 MIN: CPT | Performed by: INTERNAL MEDICINE

## 2018-07-12 NOTE — PROGRESS NOTES
"Subjective   Alexandria Alcocer is a 88 y.o. female. Patient is here for subsequent AWV, hyperlipidemia and HTN f/u.    History of Present Illness   /74 (BP Location: Left arm, Patient Position: Sitting, Cuff Size: Adult)   Pulse 72   Temp 96.7 °F (35.9 °C) (Temporal Artery )   Resp 16   Ht 160.7 cm (63.25\") Comment: Updated Height  Wt 64.5 kg (142 lb 3.2 oz)   LMP  (LMP Unknown)   SpO2 98%   Breastfeeding? No   BMI 24.99 kg/m²   Patient is being seen for subsequent wellness visit.  Hospitalizations in a past: once, for pacemaker in 2016, none last year  Once per lifetime Medicare screening tests: ECG - 02/07/2011, nl    AAA risk assessment: 1. FH: none. 2.H/o tobacco smoking: none. 3. CV or PAD: none.    Tobacco use: none   Alcohol use: none  Illicit drugs use: none  Diet: healthy heart  Exercise: none    Anxiety screening: How many days in the last 2 weeks you were  1. Feeling anxious, nervous, on edge: often gets upset as she is taking care of the sister with dementia.  2. Unable to stop worrying: none  3. Worrying too much about different things: none  4. Having problems relaxing:none  5. Feeling restless or unable to sit still:none  6. Feeling irritable or easely annoyed: none  7. Being afraid that something awful might happen: none    Depression screening PHQ9:  Over the past 2 weeks how often have you been bothered by  1. Lack of interest or pleasuer in usual activities: none  2. Feeling down, depressed, hopeless:none  3. Troubles falling asleep/sleeping too long:none  4. Feeling tired, having little energy: none  5. Poor appetite or overeating: none  6. Feeling bad about yourself:none.  7. Trouble concentrating: at the baseline.  8. Moving or speaking too slow or much faster than usual: none  9. Thoughts about harming yourself:none.    Cognitive impairment screening:   Do you have difficulties with:  1. Language: no  2. Behavior: no  3. Learning/retaining new information: no  4. Handling complex " "tasks: no  5. Reasoning: no  6. Spacial ability and orientation: no    Hearing: normal.  Driving: unrestricted  ADL: independent  ADL details: Does patient needs help with:  telephone use: no  Transportation: no  Housework: no  Shopping: no  meal preparation: no  laundry: no  managing medication:no  Participate in the social activities: Y    Fall risk factors:   1.Use of alcohol: no    2.Polypharmacy: no  3.H/o of previous fall:  no  4. Mobility impairment:  no  5. Visual impairment:  no  6. Deconditioning: yes  7. Use of antihypertensive medication:  yes  8. Use of sedatives: no  9. Use of antidepressant: no    Home safety:   1.loose rugs: no   2.unfamiliar environment: no   3. Uneven floors: no   4. No grab bars in the bathroom: yes, recommended to install  5. No banister on stairs: no      Advanced directives: has Living Will. Discussed. I agree with patient wishes and decision..    Co-managers: ophthalmology , dermatology , ortho , gastroenterologist       /74 (BP Location: Left arm, Patient Position: Sitting, Cuff Size: Adult)   Pulse 72   Temp 96.7 °F (35.9 °C) (Temporal Artery )   Resp 16   Ht 160.7 cm (63.25\") Comment: Updated Height  Wt 64.5 kg (142 lb 3.2 oz)   LMP  (LMP Unknown)   SpO2 98%   Breastfeeding? No   BMI 24.99 kg/m²     Current Outpatient Prescriptions:   •  aspirin (ASPIR) 81 MG EC tablet, Take  by mouth., Disp: , Rfl:   •  Calcium Carb-Cholecalciferol (CALCIUM 600 + D) 600-200 MG-UNIT tablet, Take  by mouth., Disp: , Rfl:   •  Cholecalciferol (VITAMIN D3) 2000 UNITS capsule, Take  by mouth., Disp: , Rfl:   •  metoprolol tartrate (LOPRESSOR) 25 MG tablet, TAKE 1 TABLET BY MOUTH TWO  TIMES DAILY, Disp: 180 tablet, Rfl: 1  •  Omega-3 Fatty Acids (FISH OIL) 1000 MG capsule capsule, Take  by mouth., Disp: , Rfl:   •  RESTASIS 0.05 % ophthalmic emulsion, , Disp: , Rfl:   •  simvastatin (ZOCOR) 20 MG tablet, TAKE 1 TABLET BY MOUTH  EVERY NIGHT, Disp: 90 " "tablet, Rfl: 1  •  valsartan-hydrochlorothiazide (DIOVAN-HCT) 320-12.5 MG per tablet, TAKE 1 TABLET BY MOUTH  DAILY, Disp: 90 tablet, Rfl: 1    Patient has long-standing benign essential HTN.  BP is usually well controlled with daily use of Ca channel blocker, thiazide diuretic and ARB. On low salt diet with good compliance. Takes medication regularly. Denies chest pain, dyspnea,lightheadedness,  lower extremity edema. Patient does not check blood pressure    Patient has been diagnosed with hyperlipidemia. Target LDL is < 100 mg/dl (CHD or \"CHD risk equivalent\" is present). Patient is on low fat diet with good compliance. Patient is compliant with treatment: daily use of Zocor (simvastatin). Side effects of medication: none. Exercise none.                                  The following portions of the patient's history were reviewed and updated as appropriate: allergies, current medications, past family history, past medical history, past social history, past surgical history and problem list.    Review of Systems   Constitutional: Negative for chills and fever.   HENT: Negative for congestion.    Respiratory: Negative for cough and shortness of breath.    Cardiovascular: Negative for chest pain and leg swelling.   Gastrointestinal: Positive for blood in stool. Negative for abdominal pain.   Genitourinary: Negative for difficulty urinating and flank pain.   Musculoskeletal: Positive for back pain. Negative for neck pain.   Skin: Negative for color change, rash and wound.   Neurological: Negative for dizziness and headaches.   Psychiatric/Behavioral: Positive for sleep disturbance. The patient is not nervous/anxious.        Objective   Physical Exam   Constitutional: She is oriented to person, place, and time. Vital signs are normal. She appears well-developed and well-nourished. No distress.   HENT:   Head: Normocephalic and atraumatic.   Right Ear: External ear normal.   Left Ear: External ear normal.   Nose: Nose " normal. No mucosal edema. Right sinus exhibits no maxillary sinus tenderness and no frontal sinus tenderness. Left sinus exhibits no maxillary sinus tenderness and no frontal sinus tenderness.   Mouth/Throat: Oropharynx is clear and moist. No oropharyngeal exudate.   Eyes: Conjunctivae, EOM and lids are normal. Pupils are equal, round, and reactive to light. Right eye exhibits no discharge. Left eye exhibits no discharge. Right conjunctiva is not injected. Left conjunctiva is not injected. No scleral icterus. Right eye exhibits normal extraocular motion. Left eye exhibits normal extraocular motion.   Neck: Normal range of motion and full passive range of motion without pain. Neck supple. No JVD present. Carotid bruit is not present. No thyromegaly present.   Cardiovascular: Normal rate, regular rhythm, S1 normal, S2 normal, normal heart sounds and intact distal pulses.  PMI is not displaced.  Exam reveals no gallop and no friction rub.    No murmur heard.  Pulmonary/Chest: Effort normal and breath sounds normal. No accessory muscle usage. No respiratory distress. She has no decreased breath sounds. She has no wheezes. She has no rhonchi. She has no rales.   Abdominal: Soft. Bowel sounds are normal. She exhibits no distension, no pulsatile liver, no fluid wave, no abdominal bruit, no ascites and no mass. There is no tenderness. There is no rebound and no guarding.   Musculoskeletal: She exhibits no edema or deformity.   Lymphadenopathy:        Head (right side): No submental, no submandibular, no preauricular, no posterior auricular and no occipital adenopathy present.        Head (left side): No submental, no submandibular, no tonsillar, no preauricular, no posterior auricular and no occipital adenopathy present.     She has no cervical adenopathy.        Right cervical: No superficial cervical, no deep cervical and no posterior cervical adenopathy present.       Left cervical: No superficial cervical, no deep  cervical and no posterior cervical adenopathy present.        Right: No supraclavicular adenopathy present.        Left: No supraclavicular adenopathy present.   Neurological: She is alert and oriented to person, place, and time. She has normal strength and normal reflexes. She displays normal reflexes. No cranial nerve deficit. She exhibits normal muscle tone. Coordination normal.   Reflex Scores:       Bicep reflexes are 2+ on the right side and 2+ on the left side.       Patellar reflexes are 2+ on the right side and 2+ on the left side.  Skin: Skin is warm and dry. No rash noted. She is not diaphoretic. No erythema.   Psychiatric: She has a normal mood and affect. Her speech is normal and behavior is normal. Thought content normal.   Vitals reviewed.      Assessment/Plan   Diagnoses and all orders for this visit:    Health care maintenance    Pure hypercholesterolemia  -     Comprehensive Metabolic Panel; Future  -     Lipid Panel; Future    Post-menopause  -     DEXA Bone Density Axial; Future    Visit for screening mammogram  -     Mammo Screening Bilateral With CAD; Future    Impaired fasting glucose    Benign essential hypertension    History of adenomatous polyp of colon  -     Ambulatory Referral to Gastroenterology        Annual wellness visit with preventive exam as well as age and risk appropriate councelling completed.    Cardiovascular disease councelling: current. Recommended: Continue statin and ASA., Excellent cholesterol.  Diabetes screening and councelling: current. Recommended: Low carb diet recommended. Needs to avoid starches and sweets.  Breast cancer screening: is due. Will schedule..  Osteoporosis screening: is due, will schedule. Benefits explained..  Glaucoma screening: up to date.   AAA screening: not needed..  Hep C screening (born between [birth year]17-5[birth year]) not needed, patient is not in the age group, and has no risk factors..  Colorectal cancer screening: is due. Will schedule. Benefits and  "risks discussed..    Immunizations:   1. Influenza vaccine  is up to date and recommended yearly.   2. Pneumococcal vaccines: completed.   3. Zostavax: discussed and recommended to get Shindrix at [facility].      Advanced directives planning: has Living Will. Discussed. I agree with patient wishes and decision..    Medications reviewed and medication list updated.   Potential harmful drug-disease interactions in the elderly:statin and impaired fasting blood sugar.  High risk medication in elderly: none  ASA use: continue daily ASA 81 mg.    HTN - well controlled with current medication regimen. Normal kidney tests and electrolytes. Recommended low salt diet. Continue same medical treatment.  Hyperlipidemia - well controlled with statin. Normal liver function tests. LDL target is below < 70 mg/dl (\"very high\" risk for CHD). Patient's 44. Continue same treatment. Regular exercise recommended.  Impaired fasting blood sugars. Low carb diet and regular exercise recommended.  H/o colon polyp - patient is in the excellent mental and physical shape, had good quality of life. Will refer for C-scope.   "

## 2018-07-12 NOTE — PATIENT INSTRUCTIONS
"Annual wellness visit with preventive exam as well as age and risk appropriate councelling completed.    Cardiovascular disease councelling: current. Recommended: Continue statin and ASA., Excellent cholesterol.  Diabetes screening and councelling: current. Recommended: Low carb diet recommended. Needs to avoid starches and sweets.  Breast cancer screening: is due. Will schedule..  Osteoporosis screening: is due, will schedule. Benefits explained..  Glaucoma screening: up to date.   AAA screening: not needed..  Hep C screening (born between [birth year]65-4[birth year]) not needed, patient is not in the age group, and has no risk factors..  Colorectal cancer screening: is due. Will schedule. Benefits and risks discussed..    Immunizations:   1. Influenza vaccine  is up to date and recommended yearly.   2. Pneumococcal vaccines: completed.   3. Zostavax: discussed and recommended to get Sh[facility]rix at [facility].      Advanced directives planning: has Living Will. Discussed. I agree with patient wishes and decision..    Medications reviewed and medication list updated.   Potential harmful drug-disease interactions in the elderly:statin and impaired fasting blood sugar.  High risk medication in elderly: none  ASA use: continue daily ASA 81 mg.    HTN - well controlled with current medication regimen. Normal kidney tests and electrolytes. Recommended low salt diet. Continue same medical treatment.  Hyperlipidemia - well controlled with statin. Normal liver function tests. LDL target is below < 70 mg/dl (\"very high\" risk for CHD). Patient's 44. Continue same treatment. Regular exercise recommended.  Impaired fasting blood sugars. Low carb diet and regular exercise recommended.  H/o colon polyp - patient is in the excellent mental and physical shape, had good quality of life. Will refer for C-scope.   Return in about 6 months (around 1/12/2019) for HTN, BS, cholest.  "

## 2018-08-27 DIAGNOSIS — I10 BENIGN ESSENTIAL HYPERTENSION: ICD-10-CM

## 2018-08-27 RX ORDER — VALSARTAN AND HYDROCHLOROTHIAZIDE 320; 12.5 MG/1; MG/1
TABLET, FILM COATED ORAL
Qty: 90 TABLET | Refills: 0 | Status: SHIPPED | OUTPATIENT
Start: 2018-08-27 | End: 2018-11-26 | Stop reason: SDUPTHER

## 2018-09-24 ENCOUNTER — CLINICAL SUPPORT NO REQUIREMENTS (OUTPATIENT)
Dept: CARDIOLOGY | Facility: CLINIC | Age: 83
End: 2018-09-24

## 2018-09-24 DIAGNOSIS — I49.5 SICK SINUS SYNDROME (HCC): Primary | ICD-10-CM

## 2018-09-24 PROCEDURE — 93280 PM DEVICE PROGR EVAL DUAL: CPT | Performed by: INTERNAL MEDICINE

## 2018-11-26 DIAGNOSIS — I10 BENIGN ESSENTIAL HYPERTENSION: ICD-10-CM

## 2018-11-26 DIAGNOSIS — E78.00 PURE HYPERCHOLESTEROLEMIA: ICD-10-CM

## 2018-11-26 RX ORDER — VALSARTAN AND HYDROCHLOROTHIAZIDE 320; 12.5 MG/1; MG/1
TABLET, FILM COATED ORAL
Qty: 90 TABLET | Refills: 1 | Status: SHIPPED | OUTPATIENT
Start: 2018-11-26 | End: 2019-07-24 | Stop reason: SDUPTHER

## 2018-11-26 RX ORDER — SIMVASTATIN 20 MG
20 TABLET ORAL NIGHTLY
Qty: 90 TABLET | Refills: 1 | Status: SHIPPED | OUTPATIENT
Start: 2018-11-26 | End: 2019-07-24 | Stop reason: SDUPTHER

## 2018-12-27 ENCOUNTER — CLINICAL SUPPORT NO REQUIREMENTS (OUTPATIENT)
Dept: CARDIOLOGY | Facility: CLINIC | Age: 83
End: 2018-12-27

## 2018-12-27 DIAGNOSIS — I49.5 SICK SINUS SYNDROME (HCC): Primary | ICD-10-CM

## 2018-12-27 PROCEDURE — 93294 REM INTERROG EVL PM/LDLS PM: CPT | Performed by: INTERNAL MEDICINE

## 2018-12-27 PROCEDURE — 93296 REM INTERROG EVL PM/IDS: CPT | Performed by: INTERNAL MEDICINE

## 2019-03-06 PROBLEM — Z95.0 PRESENCE OF PERMANENT CARDIAC PACEMAKER: Status: ACTIVE | Noted: 2019-03-06

## 2019-03-06 NOTE — PROGRESS NOTES
Date of Office Visit: 2019  Encounter Provider: SAMANTHA Garrett  Place of Service: [facility]  Patient Name: Alexandria Alcocer  :3/8/1930      Subjective:     Chief Complaint:  Sick sinus syndrome status post pacemaker implantation    History of Present Illness:  Alexandria Alcocer is a pleasant 89 y.o. female who is new to me.  Outside records have been obtained and reviewed by me.     Patient was last in the office on 3/8/18.  She follows with Dr. Suazo for sick sinus syndrome status post permanent pacemaker implantation.  At her last office visit she was feeling well without any complaints from a cardiac standpoint.  She had occasional fatigue issues but no chest pain, shortness of breath or palpitations.  Her pacemaker was checked that day with normal function and no significant arrhythmias blood pressure was slightly elevated that day at 162/72.  Dr. Suazo wanted her to continue to monitor at home.  No other med changes were made.  Her last interrogation of her pacemaker remotely was done on 18.  Since her last check in September she had one nonsustained episode of atrial fibrillation with a ventricular rate of 150 bpm with 2:1 conduction at times lasting 16 seconds on 12/10/18.    She is here today for yearly follow-up visit.  On her interrogation of her device today she had normal dual-chamber pacemaker function and there was no recurrence of atrial fibrillation bursts since that one episode on 12/10/18 lasting 16 seconds.  Overall she is doing well.  She denies any chest pain, shortness of breath or palpitations.  She reports occasionally if she is doing the dishes she will feel little fatigued but denies any dizziness, lightheadedness, peripheral edema, syncope or near syncope.  She denies any limitations  With her activities of daily living and she reports that she has help of nieces and nephews if need be.  She does not check her blood pressure at  home although she does have a blood pressure cuff.  Her blood pressure is little elevated today at 160/84.  She denies any headaches or blurred vision.  She does follow regularly with an eye doctor.  She does admit to eating a little bit more salty foods recently.      Past Medical History:   Diagnosis Date   • Arthritis    • Bell's palsy    • Cataract    • Cholelithiasis      s/p cholecystectomy 1997,    • Degenerative disc disease, thoracic    • Esophageal reflux     10/08 Dr Fuller   • Esophagitis     EDG 10/08 Dr Fuller   • H/O Doppler ultrasound of artery carotid     nl 09/13, has a plaque without stenosis in the R bifurcation ICA   • H/O echocardiogram 2D     07/2009: EF 60%, mild MR, mild TR, PA pressure 38, diastolic dysfunction 07/2014: EF 55%, grade I diast dysf-n   • Hemorrhoids    • Herpes zoster     7/2011   • Hypercholesterolemia    • Hyperglycemia    • Lightheadedness 10/7/2016   • Lung nodule seen on imaging study     none seen on the x ray 11/2014   • Mammogram abnormal     2013 had normal f/u evaluation normal 7/14 [facility] 7/25/15   • Near syncope 8/26/2016   • Neuralgia, postherpetic    • Occupational bursitis     right shoulder   • Osteopenia     Dexa 4/7/2011   • Postherpetic polyneuropathy    • Tubular adenoma of colon      . 10/08, 3 polyps with mild dysplasia, 11/11 2 polyps transverse colon, also had hyperplastic polyp in 2002   • Vasovagal syncope     1992   • Visit for screening mammogram     nl 06/18/13, 07/2014 [facility]     Past Surgical History:   Procedure Laterality Date   • BRONCHOSCOPY      1996, bronchoscopy with Bx - inflammatory lung nodule   • CARDIAC ELECTROPHYSIOLOGY PROCEDURE N/A 2/6/2017    Procedure: Pacemaker DC new, [employer];  Surgeon: Liban Suazo MD;  Location: [facility] INVASIVE LOCATION;  Service:    • CATARACT EXTRACTION     • CHOLECYSTECTOMY      Dr Ruiz   • COLONOSCOPY      · , 11/29/2011, tubular adenoma 3-5 mm x 2 in  the transverse colon.   • INCISIONAL BREAST BIOPSY      1998 also had ducrogram due to D/C   • POLYPECTOMY     • TONSILLECTOMY     • TOTAL ABDOMINAL HYSTERECTOMY      with removal of both ovaries     Outpatient Medications Prior to Visit   Medication Sig Dispense Refill   • aspirin (ASPIR) 81 MG EC tablet Take  by mouth.     • Cholecalciferol (VITAMIN D3) 2000 UNITS capsule Take  by mouth.     • metoprolol tartrate (LOPRESSOR) 25 MG tablet TAKE 1 TABLET BY MOUTH TWO  TIMES DAILY 180 tablet 1   • simvastatin (ZOCOR) 20 MG tablet TAKE 1 TABLET BY MOUTH  EVERY NIGHT 90 tablet 1   • valsartan-hydrochlorothiazide (DIOVAN-HCT) 320-12.5 MG per tablet TAKE 1 TABLET BY MOUTH  DAILY 90 tablet 1   • Calcium Carb-Cholecalciferol (CALCIUM 600 + D) 600-200 MG-UNIT tablet Take  by mouth.     • Omega-3 Fatty Acids (FISH OIL) 1000 MG capsule capsule Take  by mouth.     • RESTASIS 0.05 % ophthalmic emulsion        No facility-administered medications prior to visit.        Allergies as of 03/11/2019   • (No Known Allergies)     Social History     Socioeconomic History   • Marital status: Single     Spouse name: Not on file   • Number of children: Not on file   • Years of education: Not on file   • Highest education level: Not on file   Social Needs   • Financial resource strain: Not on file   • Food insecurity - worry: Not on file   • Food insecurity - inability: Not on file   • Transportation needs - medical: Not on file   • Transportation needs - non-medical: Not on file   Occupational History   • Not on file   Tobacco Use   • Smoking status: Never Smoker   • Smokeless tobacco: Never Used   Substance and Sexual Activity   • Alcohol use: No   • Drug use: No   • Sexual activity: Defer   Other Topics Concern   • Not on file   Social History Narrative   • Not on file     Family History   Problem Relation Age of Onset   • Breast cancer Mother    • Lung cancer Mother    • Cardiomyopathy Mother    • Hypertension Mother    • Breast cancer  "Sister    • Lymphoma Sister    • Hypertension Sister    • Macular degeneration Sister    • Lung cancer Sister         non smoker   • Diabetes Brother    • Hypertension Brother    • Lung cancer Brother      Review of Systems   Constitution: Negative for chills, fever and malaise/fatigue.   HENT: Negative for ear pain, hearing loss, nosebleeds and sore throat.    Eyes: Negative for double vision, pain, vision loss in left eye and vision loss in right eye.   Cardiovascular: Negative for chest pain, claudication, dyspnea on exertion, irregular heartbeat, leg swelling, near-syncope, orthopnea, palpitations, paroxysmal nocturnal dyspnea and syncope.   Respiratory: Negative for cough, shortness of breath, snoring and wheezing.    Endocrine: Negative for cold intolerance and heat intolerance.   Hematologic/Lymphatic: Negative for bleeding problem.   Skin: Negative for color change, itching, rash and unusual hair distribution.   Musculoskeletal: Negative for joint pain and joint swelling.   Gastrointestinal: Negative for abdominal pain, diarrhea, hematochezia, melena, nausea and vomiting.   Genitourinary: Negative for decreased libido, frequency, hematuria, hesitancy and incomplete emptying.   Neurological: Negative for excessive daytime sleepiness, dizziness, headaches, light-headedness, loss of balance, numbness, paresthesias and seizures.   Psychiatric/Behavioral: Negative for depression.          Objective:     Vitals:    03/11/19 1429   BP: 160/80   BP Location: Right arm   Patient Position: Sitting   Cuff Size: Adult   Pulse: 60   Weight: 62.6 kg (138 lb)   Height: 165.1 cm (65\")     Body mass index is 22.96 kg/m².    PHYSICAL EXAM:  Physical Exam   Constitutional: She is oriented to person, place, and time. She appears well-developed and well-nourished. No distress.   HENT:   Head: Normocephalic and atraumatic.   Eyes: Conjunctivae and EOM are normal.   Wears glasses   Neck: Neck supple. No JVD present. Carotid bruit " is not present.   Cardiovascular: Normal rate, regular rhythm, normal heart sounds and intact distal pulses.   No murmur heard.  Pulses:       Radial pulses are 2+ on the right side, and 2+ on the left side.        Posterior tibial pulses are 2+ on the right side, and 2+ on the left side.   Pulmonary/Chest: Effort normal and breath sounds normal. No accessory muscle usage. No tachypnea. No respiratory distress. She has no decreased breath sounds. She has no wheezes. She has no rhonchi. She has no rales. She exhibits no tenderness.   Left chest PPM   Abdominal: Soft. Bowel sounds are normal. She exhibits no distension. There is no tenderness. There is no rebound and no guarding.   Musculoskeletal: Normal range of motion. She exhibits no edema.   Neurological: She is alert and oriented to person, place, and time.   Skin: Skin is warm, dry and intact. She is not diaphoretic. No erythema.   Psychiatric: She has a normal mood and affect. Her speech is normal and behavior is normal. Judgment and thought content normal. Cognition and memory are normal.   Nursing note and vitals reviewed.        ECG 12 Lead  Date/Time: 3/11/2019 2:24 PM  Performed by: Lacey Gibson APRN  Authorized by: Lacey Gibson APRN   Comparison: compared with previous ECG from 2/6/2017  Comparison to previous ECG: No longer in SB with paroxysm of atrial fib  Rhythm: paced  Rate: normal  BPM: 60    Clinical impression: abnormal EKG  Comments: Atrial paced rhythm  Indication: PAF          7/30/14 Echo:    Assessment:       Diagnosis Plan   1. Sick sinus syndrome (CMS/HCC)  ECG 12 Lead   2. Presence of permanent cardiac pacemaker  ECG 12 Lead   3. Benign essential hypertension  ECG 12 Lead       Plan:     1. Sick sinus syndrome: Permanent pacemaker implantation.  Interrogated today with normal function.  2. Hypertension: Blood pressure elevated at 160/84.  On valsartan-HCTZ 320-12.5 mg daily.  On metoprolol tartrate 25 mg twice daily.  Admits to  eating increased salt recently.  We will have her start monitoring at home and will touch base with her in a couple weeks to see if we need med titration.  My plan will be to start low-dose amlodipine if her blood pressure is running elevated at home.  3. Nonsustained episode of atrial fibrillation: 16 seconds on 12/10/19.  No recurrence on today's interrogation.  We will continue to monitor for increased burden and possible need for anticoagulation in the future        Overall the patient appears stable from a cardiac standpoint.  Her blood pressure is a little high today but I am going to have her check her blood pressure at home to see if she is running persistently elevated. She will try to decrease his sodium intake.  She reports she is due for a follow-up appointment as she missed her appointment in January with Dr. Schmidt (PCP).     Follow up with Dr. Suazo in 6 months, unless otherwise needed sooner.  I advised the patient to contact our office with any questions or concerns.         Your medication list           Accurate as of 3/11/19  2:54 PM. If you have any questions, ask your nurse or doctor.               CONTINUE taking these medications      Instructions Last Dose Given Next Dose Due   ASPIR 81 MG EC tablet  Generic drug:  aspirin      Take  by mouth.       metoprolol tartrate 25 MG tablet  Commonly known as:  LOPRESSOR      TAKE 1 TABLET BY MOUTH TWO  TIMES DAILY       simvastatin 20 MG tablet  Commonly known as:  ZOCOR      TAKE 1 TABLET BY MOUTH  EVERY NIGHT       valsartan-hydrochlorothiazide 320-12.5 MG per tablet  Commonly known as:  DIOVAN-HCT      TAKE 1 TABLET BY MOUTH  DAILY       Vitamin D3 2000 units capsule      Take  by mouth.              The above medication changes may not have been made by this provider.  Medication list was updated to reflect medications patient is currently taking including medication changes and discontinuations made by other healthcare providers.      It has  been a pleasure to participate in this patient's care. Please feel free to contact me with any questions or concerns.     Lacey Gibson, APRN  03/11/2019       Dictated utilizing Dragon Dictation System.

## 2019-03-11 ENCOUNTER — OFFICE VISIT (OUTPATIENT)
Dept: CARDIOLOGY | Facility: CLINIC | Age: 84
End: 2019-03-11

## 2019-03-11 ENCOUNTER — CLINICAL SUPPORT NO REQUIREMENTS (OUTPATIENT)
Dept: CARDIOLOGY | Facility: CLINIC | Age: 84
End: 2019-03-11

## 2019-03-11 VITALS
HEIGHT: 65 IN | WEIGHT: 138 LBS | HEART RATE: 60 BPM | BODY MASS INDEX: 22.99 KG/M2 | DIASTOLIC BLOOD PRESSURE: 80 MMHG | SYSTOLIC BLOOD PRESSURE: 162 MMHG

## 2019-03-11 DIAGNOSIS — I49.5 SICK SINUS SYNDROME (HCC): Primary | ICD-10-CM

## 2019-03-11 DIAGNOSIS — I10 BENIGN ESSENTIAL HYPERTENSION: ICD-10-CM

## 2019-03-11 DIAGNOSIS — Z95.0 PRESENCE OF PERMANENT CARDIAC PACEMAKER: ICD-10-CM

## 2019-03-11 PROCEDURE — 93000 ELECTROCARDIOGRAM COMPLETE: CPT | Performed by: NURSE PRACTITIONER

## 2019-03-11 PROCEDURE — 93280 PM DEVICE PROGR EVAL DUAL: CPT | Performed by: INTERNAL MEDICINE

## 2019-03-11 PROCEDURE — 99213 OFFICE O/P EST LOW 20 MIN: CPT | Performed by: NURSE PRACTITIONER

## 2019-03-26 ENCOUNTER — TELEPHONE (OUTPATIENT)
Dept: CARDIOLOGY | Facility: CLINIC | Age: 84
End: 2019-03-26

## 2019-03-26 NOTE — TELEPHONE ENCOUNTER
Called and spoke with the patient. She could not find her blood pressure cuff. She reports she will purchase a new one if she cannot find hers and touch base with me on her blood pressuers in a couple weeks to let me know if they are staying elevated.

## 2019-04-12 ENCOUNTER — TELEPHONE (OUTPATIENT)
Dept: CARDIOLOGY | Facility: CLINIC | Age: 84
End: 2019-04-12

## 2019-04-12 NOTE — TELEPHONE ENCOUNTER
Lacey,    I contacted the pt she has not yet purchased a blood pressure cuff, she said that she is going to, I advised her once she does to call us back with her readings in 1-2 weeks    Thank you  Delmy

## 2019-04-12 NOTE — TELEPHONE ENCOUNTER
Thank you. Do you mind reaching back out to her in a few weeks cause this is the 2nd time I have called and she has not purchased a cuff to start monitoring?

## 2019-04-12 NOTE — TELEPHONE ENCOUNTER
----- Message from SAMANTHA Garrett sent at 4/11/2019 11:29 AM EDT -----  Would you mind calling this patient to find out if she had purchased a blood pressure cuff and had been monitoring her blood pressure at home.?      I called a couple weeks ago and she had not started monitoring her blood pressure and I advised her to do so.      If she has not purchased a blood pressure cuff will you please ask that she do so and start monitoring it and call in with her readings in a week or 2 please. If she has started monitoring will you let me know what her readings are running and I will call her back if she needs any medication adjustments thank you!

## 2019-04-30 ENCOUNTER — TELEPHONE (OUTPATIENT)
Dept: CARDIOLOGY | Facility: CLINIC | Age: 84
End: 2019-04-30

## 2019-05-01 NOTE — TELEPHONE ENCOUNTER
I have left several voicemails for this pt to call me back regarding her buying a bp cuff. [email or web address]

## 2019-06-13 ENCOUNTER — CLINICAL SUPPORT NO REQUIREMENTS (OUTPATIENT)
Dept: CARDIOLOGY | Facility: CLINIC | Age: 84
End: 2019-06-13

## 2019-06-13 DIAGNOSIS — I49.5 SICK SINUS SYNDROME (HCC): Primary | ICD-10-CM

## 2019-06-13 PROCEDURE — 93294 REM INTERROG EVL PM/LDLS PM: CPT | Performed by: INTERNAL MEDICINE

## 2019-06-13 PROCEDURE — 93296 REM INTERROG EVL PM/IDS: CPT | Performed by: INTERNAL MEDICINE

## 2019-07-24 DIAGNOSIS — I10 BENIGN ESSENTIAL HYPERTENSION: ICD-10-CM

## 2019-07-24 DIAGNOSIS — E78.00 PURE HYPERCHOLESTEROLEMIA: ICD-10-CM

## 2019-07-24 RX ORDER — VALSARTAN AND HYDROCHLOROTHIAZIDE 320; 12.5 MG/1; MG/1
TABLET, FILM COATED ORAL
Qty: 90 TABLET | Refills: 1 | Status: SHIPPED | OUTPATIENT
Start: 2019-07-24

## 2019-07-24 RX ORDER — SIMVASTATIN 20 MG
20 TABLET ORAL NIGHTLY
Qty: 90 TABLET | Refills: 1 | Status: SHIPPED | OUTPATIENT
Start: 2019-07-24 | End: 2020-10-15

## 2019-10-10 ENCOUNTER — CLINICAL SUPPORT NO REQUIREMENTS (OUTPATIENT)
Dept: CARDIOLOGY | Facility: CLINIC | Age: 84
End: 2019-10-10

## 2019-10-10 ENCOUNTER — OFFICE VISIT (OUTPATIENT)
Dept: CARDIOLOGY | Facility: CLINIC | Age: 84
End: 2019-10-10

## 2019-10-10 VITALS
HEIGHT: 66 IN | DIASTOLIC BLOOD PRESSURE: 84 MMHG | HEART RATE: 71 BPM | WEIGHT: 137.6 LBS | BODY MASS INDEX: 22.11 KG/M2 | SYSTOLIC BLOOD PRESSURE: 142 MMHG | OXYGEN SATURATION: 99 %

## 2019-10-10 DIAGNOSIS — I49.5 SICK SINUS SYNDROME (HCC): Primary | ICD-10-CM

## 2019-10-10 DIAGNOSIS — I10 BENIGN ESSENTIAL HYPERTENSION: ICD-10-CM

## 2019-10-10 PROCEDURE — 93280 PM DEVICE PROGR EVAL DUAL: CPT | Performed by: INTERNAL MEDICINE

## 2019-10-10 PROCEDURE — 99214 OFFICE O/P EST MOD 30 MIN: CPT | Performed by: INTERNAL MEDICINE

## 2019-10-10 NOTE — PROGRESS NOTES
Date of Office Visit: 10/10/2019    Patient Name: Alexandria Alcocer  : 3/8/1930    Encounter Provider: Liban Suazo MD  Referring Provider: No ref. provider found  Place of Service: [facility]  Patient Care Team:  Jen Schmidt MD as PCP - General  Jen Schmidt MD as PCP - Family Medicine  Lacey Gibson APRN as PCP - Claims Attributed      Sick sinus syndrome, hypertension  History of Present Illness    Patient is an 89-year-old white female with sick sinus syndrome.  She is status post permanent pacemaker implant.  Her device was checked today.  The device appears to be working appropriately well.  Patient has no complaints of chest pain or shortness of breath.  She denies lightheadedness, dizziness, orthopnea or paroxysmal nocturnal dyspnea.    Past Medical History:   Diagnosis Date   • Arthritis    • Bell's palsy    • Cataract    • Cholelithiasis      s/p cholecystectomy ,    • Degenerative disc disease, thoracic    • Esophageal reflux     10/08 Dr Fuller   • Esophagitis     EDG 10/08 Dr Fuller   • H/O Doppler ultrasound of artery carotid     nl , has a plaque without stenosis in the R bifurcation ICA   • H/O echocardiogram 2D     2009: EF 60%, mild MR, mild TR, PA pressure 38, diastolic dysfunction 2014: EF 55%, grade I diast dysf-n   • Hemorrhoids    • Herpes zoster     2011   • Hypercholesterolemia    • Hyperglycemia    • Lightheadedness 10/7/2016   • Lung nodule seen on imaging study     none seen on the x ray 2014   • Mammogram abnormal      had normal f/u evaluation normal  [facility] 7/25/15   • Near syncope 2016   • Neuralgia, postherpetic    • Occupational bursitis     right shoulder   • Osteopenia     Dexa 2011   • Postherpetic polyneuropathy    • Tubular adenoma of colon      . 10/08, 3 polyps with mild dysplasia,  2 polyps transverse colon, also had hyperplastic polyp in  2002   • Vasovagal syncope     1992   • Visit for screening mammogram     nl 06/18/13, 07/2014 [facility]         Past Surgical History:   Procedure Laterality Date   • BRONCHOSCOPY      1996, bronchoscopy with Bx - inflammatory lung nodule   • CARDIAC ELECTROPHYSIOLOGY PROCEDURE N/A 2/6/2017    Procedure: Pacemaker DC new, [employer];  Surgeon: Liban Suazo MD;  Location: [facility] INVASIVE LOCATION;  Service:    • CATARACT EXTRACTION     • CHOLECYSTECTOMY      Dr Ruiz   • COLONOSCOPY      · , 11/29/2011, tubular adenoma 3-5 mm x 2 in the transverse colon.   • INCISIONAL BREAST BIOPSY      1998 also had ducrogram due to D/C   • POLYPECTOMY     • TONSILLECTOMY     • TOTAL ABDOMINAL HYSTERECTOMY      with removal of both ovaries           Current Outpatient Medications:   •  aspirin (ASPIR) 81 MG EC tablet, Take  by mouth., Disp: , Rfl:   •  Cholecalciferol (VITAMIN D3) 2000 UNITS capsule, Take  by mouth., Disp: , Rfl:   •  metoprolol tartrate (LOPRESSOR) 25 MG tablet, TAKE 1 TABLET BY MOUTH TWO  TIMES DAILY, Disp: 180 tablet, Rfl: 1  •  simvastatin (ZOCOR) 20 MG tablet, TAKE 1 TABLET BY MOUTH  EVERY NIGHT, Disp: 90 tablet, Rfl: 1  •  valsartan-hydrochlorothiazide (DIOVAN-HCT) 320-12.5 MG per tablet, TAKE 1 TABLET BY MOUTH  DAILY, Disp: 90 tablet, Rfl: 1      Social History     Socioeconomic History   • Marital status: Single     Spouse name: Not on file   • Number of children: Not on file   • Years of education: Not on file   • Highest education level: Not on file   Tobacco Use   • Smoking status: Never Smoker   • Smokeless tobacco: Never Used   Substance and Sexual Activity   • Alcohol use: No   • Drug use: No   • Sexual activity: Defer         Review of Systems   Constitution: Negative.   HENT: Negative.    Eyes: Negative.    Cardiovascular: Negative.    Respiratory: Negative.    Endocrine: Negative.    Skin: Negative.    Musculoskeletal: Negative.    Gastrointestinal: Negative.   "  Neurological: Negative.    Psychiatric/Behavioral: Negative.        Procedures    Procedures        Objective:    /84 (BP Location: Left arm, Patient Position: Sitting, Cuff Size: Adult)   Pulse 71   Ht 167.6 cm (66\")   Wt 62.4 kg (137 lb 9.6 oz)   LMP  (LMP Unknown)   SpO2 99%   BMI 22.21 kg/m²         Physical Exam   Constitutional: She is oriented to person, place, and time. She appears well-developed and well-nourished.   HENT:   Head: Normocephalic.   Eyes: Pupils are equal, round, and reactive to light.   Neck: Normal range of motion. No JVD present. Carotid bruit is not present. No thyromegaly present.   Cardiovascular: Normal rate, regular rhythm, S1 normal, S2 normal, normal heart sounds and intact distal pulses. Exam reveals no gallop and no friction rub.   No murmur heard.  Pulmonary/Chest: Effort normal and breath sounds normal.   Abdominal: Soft. Bowel sounds are normal.   Musculoskeletal: She exhibits no edema.   Neurological: She is alert and oriented to person, place, and time.   Skin: Skin is warm, dry and intact. No erythema.   Psychiatric: She has a normal mood and affect.   Vitals reviewed.          Assessment:       Diagnosis Plan   1. Sick sinus syndrome (CMS/HCC)     2. Benign essential hypertension         1.  Sick sinus syndrome: Normal pacemaker function  2.  Hypertension: Controlled     Plan:         "

## 2020-01-16 ENCOUNTER — CLINICAL SUPPORT NO REQUIREMENTS (OUTPATIENT)
Dept: CARDIOLOGY | Facility: CLINIC | Age: 85
End: 2020-01-16

## 2020-01-16 DIAGNOSIS — I49.5 SICK SINUS SYNDROME (HCC): Primary | ICD-10-CM

## 2020-01-16 PROCEDURE — 93294 REM INTERROG EVL PM/LDLS PM: CPT | Performed by: INTERNAL MEDICINE

## 2020-01-16 PROCEDURE — 93296 REM INTERROG EVL PM/IDS: CPT | Performed by: INTERNAL MEDICINE

## 2020-03-26 ENCOUNTER — TELEPHONE (OUTPATIENT)
Dept: INTERNAL MEDICINE | Facility: CLINIC | Age: 85
End: 2020-03-26

## 2020-03-26 NOTE — TELEPHONE ENCOUNTER
When reviewing overdue orders it was discovered that patient did not have CMP and LP done in July 2019.  (Future order placed 7/12/18 for July 2019)

## 2020-04-28 ENCOUNTER — CLINICAL SUPPORT NO REQUIREMENTS (OUTPATIENT)
Dept: CARDIOLOGY | Facility: CLINIC | Age: 85
End: 2020-04-28

## 2020-04-28 DIAGNOSIS — I49.5 SICK SINUS SYNDROME (HCC): Primary | ICD-10-CM

## 2020-04-28 PROCEDURE — 93294 REM INTERROG EVL PM/LDLS PM: CPT | Performed by: INTERNAL MEDICINE

## 2020-04-28 PROCEDURE — 93296 REM INTERROG EVL PM/IDS: CPT | Performed by: INTERNAL MEDICINE

## 2020-07-30 PROCEDURE — 93296 REM INTERROG EVL PM/IDS: CPT | Performed by: INTERNAL MEDICINE

## 2020-07-30 PROCEDURE — 93294 REM INTERROG EVL PM/LDLS PM: CPT | Performed by: INTERNAL MEDICINE

## 2020-10-15 ENCOUNTER — CLINICAL SUPPORT NO REQUIREMENTS (OUTPATIENT)
Dept: CARDIOLOGY | Facility: CLINIC | Age: 85
End: 2020-10-15

## 2020-10-15 ENCOUNTER — OFFICE VISIT (OUTPATIENT)
Dept: CARDIOLOGY | Facility: CLINIC | Age: 85
End: 2020-10-15

## 2020-10-15 VITALS
WEIGHT: 138 LBS | OXYGEN SATURATION: 99 % | BODY MASS INDEX: 22.18 KG/M2 | HEART RATE: 61 BPM | SYSTOLIC BLOOD PRESSURE: 200 MMHG | DIASTOLIC BLOOD PRESSURE: 100 MMHG | HEIGHT: 66 IN

## 2020-10-15 DIAGNOSIS — I49.5 SICK SINUS SYNDROME (HCC): Primary | ICD-10-CM

## 2020-10-15 DIAGNOSIS — I10 BENIGN ESSENTIAL HYPERTENSION: ICD-10-CM

## 2020-10-15 PROCEDURE — 93280 PM DEVICE PROGR EVAL DUAL: CPT | Performed by: INTERNAL MEDICINE

## 2020-10-15 PROCEDURE — 93000 ELECTROCARDIOGRAM COMPLETE: CPT | Performed by: INTERNAL MEDICINE

## 2020-10-15 PROCEDURE — 99214 OFFICE O/P EST MOD 30 MIN: CPT | Performed by: INTERNAL MEDICINE

## 2020-10-15 RX ORDER — ALLOPURINOL 100 MG/1
100 TABLET ORAL DAILY
COMMUNITY

## 2020-10-15 RX ORDER — METOPROLOL TARTRATE 50 MG/1
50 TABLET, FILM COATED ORAL 2 TIMES DAILY
Qty: 180 TABLET | Refills: 3 | Status: SHIPPED | OUTPATIENT
Start: 2020-10-15

## 2020-10-15 NOTE — PROGRESS NOTES
Date of Office Visit: 10/15/2020    Patient Name: Alexandria Alcocer  : 3/8/1930    Encounter Provider: Liban Suazo MD  Referring Provider: No ref. provider found  Place of Service: [facility] CARDIOLOGY  Patient Care Team:  Roxy Schmidt MD as PCP - General  Roxy Schmidt MD as PCP - Family Medicine  Olga Shah APRN as PCP - Claims Attributed      Chief Complaint   Patient presents with   • Carotid Artery Disease     annual visit   • Hypertension   • Hyperlipidemia     History of Present Illness    The patient is a 89-year-old white female with a history of hypertension as well as sick sinus syndrome.  She is status post permanent pacemaker implant.    Her device was checked today there have been no issues.  Normal pacemaker function.    Blood pressure quite elevated today.  I personally took her pressure of 200/90.  She states when she had it recently checked at the [facility] her pressure was okay.  She is not really sure the level but this never told her it was abnormal.  He denies any symptoms of chest pain or shortness of breath.  She has no complaints of lightheadedness or dizziness.  She denies any visual disturbances.    Past Medical History:   Diagnosis Date   • Arthritis    • Bell's palsy    • Cataract    • Cholelithiasis      s/p cholecystectomy ,    • Degenerative disc disease, thoracic    • Esophageal reflux     10/08 Dr Fuller   • Esophagitis     EDG 10/08 Dr Fuller   • Gout    • H/O Doppler ultrasound of artery carotid     nl , has a plaque without stenosis in the R bifurcation ICA   • H/O echocardiogram 2D     2009: EF 60%, mild MR, mild TR, PA pressure 38, diastolic dysfunction 2014: EF 55%, grade I diast dysf-n   • Hemorrhoids    • Herpes zoster     2011   • Hypercholesterolemia    • Hyperglycemia    • Lightheadedness 10/7/2016   • Lung nodule seen on imaging study     none seen on the x ray 2014   • Mammogram  abnormal     2013 had normal f/u evaluation normal 7/14 [facility] 7/25/15   • Near syncope 8/26/2016   • Neuralgia, postherpetic    • Occupational bursitis     right shoulder   • Osteopenia     Dexa 4/7/2011   • Postherpetic polyneuropathy    • Tubular adenoma of colon      . 10/08, 3 polyps with mild dysplasia, 11/11 2 polyps transverse colon, also had hyperplastic polyp in 2002   • Vasovagal syncope     1992   • Visit for screening mammogram     nl 06/18/13, 07/2014 [facility]         Past Surgical History:   Procedure Laterality Date   • BRONCHOSCOPY      1996, bronchoscopy with Bx - inflammatory lung nodule   • CARDIAC ELECTROPHYSIOLOGY PROCEDURE N/A 2/6/2017    Procedure: Pacemaker DC new, [employer];  Surgeon: Liban Suazo MD;  Location: [facility] INVASIVE LOCATION;  Service:    • CATARACT EXTRACTION     • CHOLECYSTECTOMY      Dr Ruiz   • COLONOSCOPY      · , 11/29/2011, tubular adenoma 3-5 mm x 2 in the transverse colon.   • INCISIONAL BREAST BIOPSY      1998 also had ducrogram due to D/C   • POLYPECTOMY     • TONSILLECTOMY     • TOTAL ABDOMINAL HYSTERECTOMY      with removal of both ovaries           Current Outpatient Medications:   •  allopurinol (ZYLOPRIM) 100 MG tablet, Take 100 mg by mouth Daily., Disp: , Rfl:   •  metoprolol tartrate (LOPRESSOR) 50 MG tablet, Take 1 tablet by mouth 2 (Two) Times a Day., Disp: 180 tablet, Rfl: 3  •  valsartan-hydrochlorothiazide (DIOVAN-HCT) 320-12.5 MG per tablet, TAKE 1 TABLET BY MOUTH  DAILY, Disp: 90 tablet, Rfl: 1      Social History     Socioeconomic History   • Marital status: Single     Spouse name: Not on file   • Number of children: Not on file   • Years of education: Not on file   • Highest education level: Not on file   Tobacco Use   • Smoking status: Never Smoker   • Smokeless tobacco: Never Used   Substance and Sexual Activity   • Alcohol use: No   • Drug use: No   • Sexual activity: Defer         Review of Systems    "  Constitution: Negative.   HENT: Negative.    Eyes: Negative.    Cardiovascular: Negative.    Respiratory: Negative.    Endocrine: Negative.    Skin: Negative.    Musculoskeletal: Negative.    Gastrointestinal: Negative.    Neurological: Negative.    Psychiatric/Behavioral: Negative.        Procedures      ECG 12 Lead    Date/Time: 10/15/2020 1:27 PM  Performed by: Liban Suazo MD  Authorized by: Liban Suazo MD   Comparison: compared with previous ECG from 3/11/2019  Comparison to previous ECG: Atrial paced rhythm with normal conduction.                  Objective:    BP (!) 200/100   Pulse 61   Ht 167.6 cm (66\")   Wt 62.6 kg (138 lb)   LMP  (LMP Unknown)   SpO2 99%   BMI 22.27 kg/m²         Vitals signs reviewed.   Constitutional:       Appearance: Well-developed.   Eyes:      Pupils: Pupils are equal, round, and reactive to light.   HENT:      Head: Normocephalic.   Neck:      Musculoskeletal: Normal range of motion.      Thyroid: No thyromegaly.      Vascular: No carotid bruit or JVD.   Pulmonary:      Effort: Pulmonary effort is normal.      Breath sounds: Normal breath sounds.   Cardiovascular:      Normal rate. Regular rhythm.      No gallop.   Pulses:     Intact distal pulses.   Edema:     Peripheral edema absent.   Abdominal:      General: Bowel sounds are normal.      Palpations: Abdomen is soft.   Skin:     General: Skin is warm and dry.      Findings: No erythema.   Neurological:      Mental Status: Alert and oriented to person, place, and time.             Assessment:       Diagnosis Plan   1. Sick sinus syndrome (CMS/HCC)     2. Benign essential hypertension  metoprolol tartrate (LOPRESSOR) 50 MG tablet       1.  Sick sinus syndrome: Normal pacemaker function  2.  Hypertension: Uncontrolled.  Personally took her blood pressure 200/90.  Going to increase her metoprolol tartrate to 50 mg twice daily.     Plan:       Asked to have her blood pressure checked regularly while at " [facility] home and to report these to me in the next week or 2

## 2021-10-13 PROCEDURE — 93294 REM INTERROG EVL PM/LDLS PM: CPT | Performed by: INTERNAL MEDICINE

## 2021-10-13 PROCEDURE — 93296 REM INTERROG EVL PM/IDS: CPT | Performed by: INTERNAL MEDICINE

## 2021-10-19 ENCOUNTER — OFFICE VISIT (OUTPATIENT)
Dept: CARDIOLOGY | Facility: CLINIC | Age: 86
End: 2021-10-19

## 2021-10-19 ENCOUNTER — CLINICAL SUPPORT NO REQUIREMENTS (OUTPATIENT)
Dept: CARDIOLOGY | Facility: CLINIC | Age: 86
End: 2021-10-19

## 2021-10-19 VITALS
WEIGHT: 162.2 LBS | OXYGEN SATURATION: 97 % | SYSTOLIC BLOOD PRESSURE: 136 MMHG | DIASTOLIC BLOOD PRESSURE: 90 MMHG | BODY MASS INDEX: 26.07 KG/M2 | HEART RATE: 61 BPM | HEIGHT: 66 IN

## 2021-10-19 DIAGNOSIS — I10 BENIGN ESSENTIAL HYPERTENSION: ICD-10-CM

## 2021-10-19 DIAGNOSIS — I49.5 SICK SINUS SYNDROME (HCC): Primary | ICD-10-CM

## 2021-10-19 PROCEDURE — 99214 OFFICE O/P EST MOD 30 MIN: CPT | Performed by: INTERNAL MEDICINE

## 2021-10-19 PROCEDURE — 93280 PM DEVICE PROGR EVAL DUAL: CPT | Performed by: INTERNAL MEDICINE

## 2021-10-19 NOTE — PROGRESS NOTES
OFFICE VISIT      Date of Office Visit: 10/19/2021    Patient Name: Alexandria Alcocer  : 3/8/1930    Encounter Provider: Liban Suazo MD  Referring Provider: No ref. provider found  Primary Care Provider: Roxy Schmidt MD  Place of Service: [facility] CARDIOLOGY        Chief Complaint   Patient presents with   • Sick sinus syndrome   • Follow-up     History of Present Illness  The patient is a 89-year-old white female with sick sinus syndrome and hypertension who returns to the office today for follow-up.  Her pacemaker was checked with normal function.  The patient has approximately 1.5 years of battery life left.    Blood pressure today excellent.  She reports that she does not check it on a regular basis.  She is presently living in [facility].  They will check her blood pressure if it is needed but they do not do it regularly.  She is not having any symptoms associated with her hypertension.    Past Medical History:   Diagnosis Date   • Arthritis    • Bell's palsy    • Cataract    • Cholelithiasis      s/p cholecystectomy ,    • Degenerative disc disease, thoracic    • Esophageal reflux     10/08 Dr Fuller   • Esophagitis     EDG 10/08 Dr Fuller   • Gout    • H/O Doppler ultrasound of artery carotid     nl , has a plaque without stenosis in the R bifurcation ICA   • H/O echocardiogram 2D     2009: EF 60%, mild MR, mild TR, PA pressure 38, diastolic dysfunction 2014: EF 55%, grade I diast dysf-n   • Hemorrhoids    • Herpes zoster     2011   • Hypercholesterolemia    • Hyperglycemia    • Lightheadedness 10/7/2016   • Lung nodule seen on imaging study     none seen on the x ray 2014   • Mammogram abnormal      had normal f/u evaluation normal  [facility] 7/25/15   • Near syncope 2016   • Neuralgia, postherpetic    • Occupational bursitis     right shoulder   • Osteopenia     Dexa 2011   • Postherpetic polyneuropathy    •  Tubular adenoma of colon      . 10/08, 3 polyps with mild dysplasia, 11/11 2 polyps transverse colon, also had hyperplastic polyp in 2002   • Vasovagal syncope     1992   • Visit for screening mammogram     nl 06/18/13, 07/2014 [facility]         Past Surgical History:   Procedure Laterality Date   • BRONCHOSCOPY      1996, bronchoscopy with Bx - inflammatory lung nodule   • CARDIAC ELECTROPHYSIOLOGY PROCEDURE N/A 2/6/2017    Procedure: Pacemaker DC new, [employer];  Surgeon: Liban Suazo MD;  Location: [facility] INVASIVE LOCATION;  Service:    • CATARACT EXTRACTION     • CHOLECYSTECTOMY      Dr Ruiz   • COLONOSCOPY      · , 11/29/2011, tubular adenoma 3-5 mm x 2 in the transverse colon.   • INCISIONAL BREAST BIOPSY      1998 also had ducrogram due to D/C   • POLYPECTOMY     • TONSILLECTOMY     • TOTAL ABDOMINAL HYSTERECTOMY      with removal of both ovaries           Current Outpatient Medications:   •  allopurinol (ZYLOPRIM) 100 MG tablet, Take 100 mg by mouth Daily., Disp: , Rfl:   •  metoprolol tartrate (LOPRESSOR) 50 MG tablet, Take 1 tablet by mouth 2 (Two) Times a Day., Disp: 180 tablet, Rfl: 3  •  valsartan-hydrochlorothiazide (DIOVAN-HCT) 320-12.5 MG per tablet, TAKE 1 TABLET BY MOUTH  DAILY, Disp: 90 tablet, Rfl: 1      Social History     Socioeconomic History   • Marital status: Single   Tobacco Use   • Smoking status: Never Smoker   • Smokeless tobacco: Never Used   Substance and Sexual Activity   • Alcohol use: No   • Drug use: No   • Sexual activity: Defer         Review of Systems   Constitutional: Negative.   HENT: Negative.    Eyes: Negative.    Cardiovascular: Negative.    Respiratory: Negative.    Endocrine: Negative.    Skin: Negative.    Musculoskeletal: Negative.    Gastrointestinal: Negative.    Neurological: Negative.    Psychiatric/Behavioral: Negative.        Procedures    Procedures        Objective:    /90 (BP Location: Left arm, Patient Position:  "Sitting)   Pulse 61   Ht 167.6 cm (66\")   Wt 73.6 kg (162 lb 3.2 oz)   LMP  (LMP Unknown)   SpO2 97%   BMI 26.18 kg/m²         Vitals reviewed.   Constitutional:       Appearance: Healthy appearance. Well-developed and not in distress.   Neck:      Thyroid: No thyromegaly.      Vascular: No carotid bruit or JVD.   Pulmonary:      Effort: Pulmonary effort is normal.      Breath sounds: Normal breath sounds.   Cardiovascular:      Normal rate. Regular rhythm. Normal S1. Normal S2.      Murmurs: There is no murmur.      No gallop. No click. No rub.   Pulses:     Intact distal pulses.   Edema:     Peripheral edema absent.   Skin:     General: Skin is warm and dry.      Findings: No erythema.   Neurological:      Mental Status: Alert and oriented to person, place, and time.             Assessment:       Diagnosis Plan   1. Sick sinus syndrome (HCC)     2. Benign essential hypertension       1.  Sick sinus syndrome: Normal pacemaker function  2.  Hypertension: Well-controlled on current medical therapy.  Continue the same       Plan:         "

## 2022-01-12 PROCEDURE — 93296 REM INTERROG EVL PM/IDS: CPT | Performed by: INTERNAL MEDICINE

## 2022-01-12 PROCEDURE — 93294 REM INTERROG EVL PM/LDLS PM: CPT | Performed by: INTERNAL MEDICINE

## 2022-04-13 PROCEDURE — 93296 REM INTERROG EVL PM/IDS: CPT | Performed by: INTERNAL MEDICINE

## 2022-04-13 PROCEDURE — 93294 REM INTERROG EVL PM/LDLS PM: CPT | Performed by: INTERNAL MEDICINE

## 2022-10-12 PROCEDURE — 93294 REM INTERROG EVL PM/LDLS PM: CPT | Performed by: INTERNAL MEDICINE

## 2022-10-12 PROCEDURE — 93296 REM INTERROG EVL PM/IDS: CPT | Performed by: INTERNAL MEDICINE

## 2022-10-20 ENCOUNTER — CLINICAL SUPPORT NO REQUIREMENTS (OUTPATIENT)
Dept: CARDIOLOGY | Facility: CLINIC | Age: 87
End: 2022-10-20

## 2022-10-20 ENCOUNTER — TELEPHONE (OUTPATIENT)
Dept: CARDIOLOGY | Facility: CLINIC | Age: 87
End: 2022-10-20

## 2022-10-20 ENCOUNTER — OFFICE VISIT (OUTPATIENT)
Dept: CARDIOLOGY | Facility: CLINIC | Age: 87
End: 2022-10-20

## 2022-10-20 VITALS
HEART RATE: 78 BPM | OXYGEN SATURATION: 96 % | DIASTOLIC BLOOD PRESSURE: 70 MMHG | HEIGHT: 66 IN | BODY MASS INDEX: 25.39 KG/M2 | WEIGHT: 158 LBS | SYSTOLIC BLOOD PRESSURE: 160 MMHG

## 2022-10-20 DIAGNOSIS — I49.5 SICK SINUS SYNDROME: ICD-10-CM

## 2022-10-20 DIAGNOSIS — I10 BENIGN ESSENTIAL HYPERTENSION: Primary | ICD-10-CM

## 2022-10-20 DIAGNOSIS — Z95.0 PRESENCE OF PERMANENT CARDIAC PACEMAKER: ICD-10-CM

## 2022-10-20 DIAGNOSIS — I49.5 SICK SINUS SYNDROME: Primary | ICD-10-CM

## 2022-10-20 PROCEDURE — 93280 PM DEVICE PROGR EVAL DUAL: CPT | Performed by: INTERNAL MEDICINE

## 2022-10-20 PROCEDURE — 99214 OFFICE O/P EST MOD 30 MIN: CPT | Performed by: NURSE PRACTITIONER

## 2022-10-20 PROCEDURE — 93000 ELECTROCARDIOGRAM COMPLETE: CPT | Performed by: NURSE PRACTITIONER

## 2022-10-20 RX ORDER — CHOLECALCIFEROL (VITAMIN D3) 125 MCG
5 CAPSULE ORAL NIGHTLY
COMMUNITY

## 2022-10-20 RX ORDER — TAMSULOSIN HYDROCHLORIDE 0.4 MG/1
1 CAPSULE ORAL DAILY
Status: ON HOLD | COMMUNITY
Start: 2022-10-04 | End: 2022-11-17

## 2022-10-20 RX ORDER — LEVOFLOXACIN 500 MG/1
500 TABLET, FILM COATED ORAL DAILY
Status: ON HOLD | COMMUNITY
Start: 2022-10-13 | End: 2022-11-17

## 2022-10-20 RX ORDER — CYANOCOBALAMIN 1000 UG/ML
INJECTION, SOLUTION INTRAMUSCULAR; SUBCUTANEOUS
COMMUNITY
Start: 2022-09-27

## 2022-10-20 RX ORDER — ERGOCALCIFEROL 1.25 MG/1
50000 CAPSULE ORAL
COMMUNITY

## 2022-10-20 NOTE — TELEPHONE ENCOUNTER
This pt was a pt of Dr. Suazo that is now being followed by you. She has a BSX dual chamber PPM that has reached MADHAVI on 10/18/2022. She is not dependent.

## 2022-10-20 NOTE — PROGRESS NOTES
Date of Office Visit: 10/20/2022  Encounter Provider: SAMANTHA Suárez  Place of Service: [facility] CARDIOLOGY  Patient Name: Alexandria Alcocer  :3/8/1930    Chief Complaint   Patient presents with   • Follow-up   • Sick sinus syndrome    :     HPI: Alexandria Alcocer is a 89 y.o. female who is a patient of Dr. Suazo.  She is new to me today and presents for 1 year office follow-up appointment.  She has a history of sick sinus syndrome and hypertension.  On last office visit she was doing well was noted to have about 1-1/2 years of battery life left.  She is living at the [facility].    On recent device download 10/7/2022, battery life was at 0%.  She had no events.  Appears that she is a atrial paced 85% of the time.  Pacemaker checked at this office visit and MADHAVI.  She has had no events.  EKG shows sinus rhythm.  Patient denies chest pain, dizziness, fatigue or shortness of breath.  She does have chronic lower extremity edema.  Blood pressure elevated at 160/70, upon retake 160/75.      Previous testing and notes have been reviewed by me.   Past Medical History:   Diagnosis Date   • Arthritis    • Bell's palsy    • Cataract    • Cholelithiasis      s/p cholecystectomy ,    • Degenerative disc disease, thoracic    • Esophageal reflux     10/08 Dr Fuller   • Esophagitis     EDG 10/08 Dr Fuller   • Gout    • H/O Doppler ultrasound of artery carotid     nl , has a plaque without stenosis in the R bifurcation ICA   • H/O echocardiogram 2D     2009: EF 60%, mild MR, mild TR, PA pressure 38, diastolic dysfunction 2014: EF 55%, grade I diast dysf-n   • Hemorrhoids    • Herpes zoster     2011   • Hypercholesterolemia    • Hyperglycemia    • Lightheadedness 10/7/2016   • Lung nodule seen on imaging study     none seen on the x ray 2014   • Mammogram abnormal      had normal f/u evaluation normal  [facility] 7/25/15   • Near syncope 2016   •  Neuralgia, postherpetic    • Occupational bursitis     right shoulder   • Osteopenia     Dexa 4/7/2011   • Postherpetic polyneuropathy    • Tubular adenoma of colon      . 10/08, 3 polyps with mild dysplasia, 11/11 2 polyps transverse colon, also had hyperplastic polyp in 2002   • Vasovagal syncope     1992   • Visit for screening mammogram     nl 06/18/13, 07/2014 [facility]       Past Surgical History:   Procedure Laterality Date   • BRONCHOSCOPY      1996, bronchoscopy with Bx - inflammatory lung nodule   • CARDIAC ELECTROPHYSIOLOGY PROCEDURE N/A 2/6/2017    Procedure: Pacemaker DC new, [employer];  Surgeon: Liban Suazo MD;  Location: [facility] INVASIVE LOCATION;  Service:    • CATARACT EXTRACTION     • CHOLECYSTECTOMY      Dr Ruiz   • COLONOSCOPY      · , 11/29/2011, tubular adenoma 3-5 mm x 2 in the transverse colon.   • INCISIONAL BREAST BIOPSY      1998 also had ducrogram due to D/C   • POLYPECTOMY     • TONSILLECTOMY     • TOTAL ABDOMINAL HYSTERECTOMY      with removal of both ovaries       Social History     Socioeconomic History   • Marital status: Single   Tobacco Use   • Smoking status: Never   • Smokeless tobacco: Never   Substance and Sexual Activity   • Alcohol use: No   • Drug use: No   • Sexual activity: Defer       Family History   Problem Relation Age of Onset   • Breast cancer Mother    • Lung cancer Mother    • Cardiomyopathy Mother    • Hypertension Mother    • Breast cancer Sister    • Lymphoma Sister    • Hypertension Sister    • Macular degeneration Sister    • Lung cancer Sister         non smoker   • Diabetes Brother    • Hypertension Brother    • Lung cancer Brother        Review of Systems   Constitutional: Negative.   HENT: Negative.    Eyes: Negative.    Cardiovascular: Positive for leg swelling.   Respiratory: Negative.    Endocrine: Negative.    Hematologic/Lymphatic: Negative.    Skin: Negative.    Musculoskeletal: Negative.    Gastrointestinal:  "Negative.    Genitourinary: Negative.    Neurological: Negative.    Psychiatric/Behavioral: Negative.    Allergic/Immunologic: Negative.        No Known Allergies      Current Outpatient Medications:   •  allopurinol (ZYLOPRIM) 100 MG tablet, Take 100 mg by mouth Daily., Disp: , Rfl:   •  cyanocobalamin 1000 MCG/ML injection, , Disp: , Rfl:   •  levoFLOXacin (LEVAQUIN) 500 MG tablet, Take 1 tablet by mouth Daily., Disp: , Rfl:   •  melatonin 5 MG tablet tablet, Take 1 tablet by mouth Every Night., Disp: , Rfl:   •  metoprolol tartrate (LOPRESSOR) 50 MG tablet, Take 1 tablet by mouth 2 (Two) Times a Day., Disp: 180 tablet, Rfl: 3  •  tamsulosin (FLOMAX) 0.4 MG capsule 24 hr capsule, Take 1 capsule by mouth Daily., Disp: , Rfl:   •  valsartan-hydrochlorothiazide (DIOVAN-HCT) 320-12.5 MG per tablet, TAKE 1 TABLET BY MOUTH  DAILY, Disp: 90 tablet, Rfl: 1  •  vitamin D (ERGOCALCIFEROL) 1.25 MG (73830 UT) capsule capsule, Take 1 capsule by mouth Every 14 (Fourteen) Days., Disp: , Rfl:       Objective:     Vitals:    10/20/22 1101   BP: 160/70   BP Location: Right arm   Patient Position: Sitting   Cuff Size: Adult   Pulse: 78   SpO2: 96%   Weight: 71.7 kg (158 lb)   Height: 167.6 cm (66\")     Body mass index is 25.5 kg/m².    PHYSICAL EXAM:    Constitutional:       Appearance: Healthy appearance. Not in distress.   Neck:      Vascular: No JVR. JVD normal.   Pulmonary:      Effort: Pulmonary effort is normal.      Breath sounds: Normal breath sounds. No wheezing. No rhonchi. No rales.   Chest:      Chest wall: Not tender to palpatation.   Cardiovascular:      PMI at left midclavicular line. Normal rate. Regular rhythm. Normal S1. Normal S2.      Murmurs: There is no murmur.      No gallop. No click. No rub.      Comments: Generalized bilateral lower extremity edema  Pulses:     Intact distal pulses.   Edema:     Peripheral edema absent.   Abdominal:      General: Bowel sounds are normal.      Palpations: Abdomen is soft.    "   Tenderness: There is no abdominal tenderness.   Musculoskeletal: Normal range of motion.         General: No tenderness. Skin:     General: Skin is warm and dry.   Neurological:      General: No focal deficit present.      Mental Status: Alert and oriented to person, place and time.           ECG 12 Lead    Date/Time: 10/20/2022 11:56 AM  Performed by: Dolly Odonnell APRN  Authorized by: Dolly Odonnell APRN   Comparison: compared with previous ECG from 10/15/2021  Rhythm: sinus rhythm  Rate: normal  BPM: 78  ST Segments: ST segments normal  T inversion: aVL  Other findings: poor R wave progression              Assessment:       Diagnosis Plan   1. Benign essential hypertension        2. Sick sinus syndrome (HCC)        3. Presence of permanent cardiac pacemaker          No orders of the defined types were placed in this encounter.         Plan:       1.  Sick sinus syndrome: Pacemaker battery MADHAVI.  Scheduling will call patient with date and time to have replaced by Dr. Nugent.  Patient is aware and agreeable  2.  Hypertension: Elevated at today's visit and on recheck.  I will call [facility] and speak with nurse about checking daily blood pressures and calling at the end of next week with readings.  We will make adjustments accordingly.  No medication changes today.    Ms. Alcocer will follow up with Dr. Nugent as instructed.         Your medication list          Accurate as of October 20, 2022 11:51 AM. If you have any questions, ask your nurse or doctor.            CONTINUE taking these medications      Instructions Last Dose Given Next Dose Due   allopurinol 100 MG tablet  Commonly known as: ZYLOPRIM      Take 100 mg by mouth Daily.       cyanocobalamin 1000 MCG/ML injection           levoFLOXacin 500 MG tablet  Commonly known as: LEVAQUIN      Take 1 tablet by mouth Daily.       melatonin 5 MG tablet tablet      Take 1 tablet by mouth Every Night.       metoprolol tartrate 50 MG tablet  Commonly  known as: LOPRESSOR      Take 1 tablet by mouth 2 (Two) Times a Day.       tamsulosin 0.4 MG capsule 24 hr capsule  Commonly known as: FLOMAX      Take 1 capsule by mouth Daily.       valsartan-hydrochlorothiazide 320-12.5 MG per tablet  Commonly known as: DIOVAN-HCT      TAKE 1 TABLET BY MOUTH  DAILY       vitamin D 1.25 MG (69286 UT) capsule capsule  Commonly known as: ERGOCALCIFEROL      Take 1 capsule by mouth Every 14 (Fourteen) Days.                As always, it has been a pleasure to participate in your patient's care.      Sincerely,       SAMANTHA Galvin

## 2022-10-21 DIAGNOSIS — I49.5 SICK SINUS SYNDROME: Primary | ICD-10-CM

## 2022-10-24 NOTE — TELEPHONE ENCOUNTER
She was just seen 10/20 by TRICIA Odonnell--looks like she was doing okay so I think unless she wants to come in to be seen he can talk to her the day of procedure to answer any quetions

## 2022-11-02 ENCOUNTER — TRANSCRIBE ORDERS (OUTPATIENT)
Dept: CARDIOLOGY | Facility: CLINIC | Age: 87
End: 2022-11-02

## 2022-11-02 DIAGNOSIS — Z13.6 SCREENING FOR ISCHEMIC HEART DISEASE: ICD-10-CM

## 2022-11-02 DIAGNOSIS — Z01.810 PRE-OPERATIVE CARDIOVASCULAR EXAMINATION: Primary | ICD-10-CM

## 2022-11-15 ENCOUNTER — LAB (OUTPATIENT)
Dept: LAB | Facility: HOSPITAL | Age: 87
End: 2022-11-15

## 2022-11-15 DIAGNOSIS — Z01.810 PRE-OPERATIVE CARDIOVASCULAR EXAMINATION: ICD-10-CM

## 2022-11-15 DIAGNOSIS — Z13.6 SCREENING FOR ISCHEMIC HEART DISEASE: ICD-10-CM

## 2022-11-15 LAB
ANION GAP SERPL CALCULATED.3IONS-SCNC: 11.6 MMOL/L (ref 5–15)
BASOPHILS # BLD AUTO: 0.04 10*3/MM3 (ref 0–0.2)
BASOPHILS NFR BLD AUTO: 0.6 % (ref 0–1.5)
BUN SERPL-MCNC: 24 MG/DL (ref 8–23)
BUN/CREAT SERPL: 24.2 (ref 7–25)
CALCIUM SPEC-SCNC: 9.7 MG/DL (ref 8.2–9.6)
CHLORIDE SERPL-SCNC: 100 MMOL/L (ref 98–107)
CO2 SERPL-SCNC: 27.4 MMOL/L (ref 22–29)
CREAT SERPL-MCNC: 0.99 MG/DL (ref 0.57–1)
DEPRECATED RDW RBC AUTO: 41.2 FL (ref 37–54)
EGFRCR SERPLBLD CKD-EPI 2021: 53.6 ML/MIN/1.73
EOSINOPHIL # BLD AUTO: 0.08 10*3/MM3 (ref 0–0.4)
EOSINOPHIL NFR BLD AUTO: 1.2 % (ref 0.3–6.2)
ERYTHROCYTE [DISTWIDTH] IN BLOOD BY AUTOMATED COUNT: 13.1 % (ref 12.3–15.4)
GLUCOSE SERPL-MCNC: 106 MG/DL (ref 65–99)
HCT VFR BLD AUTO: 35.6 % (ref 34–46.6)
HGB BLD-MCNC: 12.1 G/DL (ref 12–15.9)
IMM GRANULOCYTES # BLD AUTO: 0.03 10*3/MM3 (ref 0–0.05)
IMM GRANULOCYTES NFR BLD AUTO: 0.5 % (ref 0–0.5)
LYMPHOCYTES # BLD AUTO: 2.65 10*3/MM3 (ref 0.7–3.1)
LYMPHOCYTES NFR BLD AUTO: 39.8 % (ref 19.6–45.3)
MCH RBC QN AUTO: 29.4 PG (ref 26.6–33)
MCHC RBC AUTO-ENTMCNC: 34 G/DL (ref 31.5–35.7)
MCV RBC AUTO: 86.4 FL (ref 79–97)
MONOCYTES # BLD AUTO: 0.5 10*3/MM3 (ref 0.1–0.9)
MONOCYTES NFR BLD AUTO: 7.5 % (ref 5–12)
NEUTROPHILS NFR BLD AUTO: 3.35 10*3/MM3 (ref 1.7–7)
NEUTROPHILS NFR BLD AUTO: 50.4 % (ref 42.7–76)
NRBC BLD AUTO-RTO: 0 /100 WBC (ref 0–0.2)
PLATELET # BLD AUTO: 173 10*3/MM3 (ref 140–450)
PMV BLD AUTO: 10.8 FL (ref 6–12)
POTASSIUM SERPL-SCNC: 4 MMOL/L (ref 3.5–5.2)
RBC # BLD AUTO: 4.12 10*6/MM3 (ref 3.77–5.28)
SODIUM SERPL-SCNC: 139 MMOL/L (ref 136–145)
WBC NRBC COR # BLD: 6.65 10*3/MM3 (ref 3.4–10.8)

## 2022-11-15 PROCEDURE — 36415 COLL VENOUS BLD VENIPUNCTURE: CPT

## 2022-11-15 PROCEDURE — 85025 COMPLETE CBC W/AUTO DIFF WBC: CPT

## 2022-11-15 PROCEDURE — 80048 BASIC METABOLIC PNL TOTAL CA: CPT

## 2022-11-17 ENCOUNTER — HOSPITAL ENCOUNTER (OUTPATIENT)
Facility: HOSPITAL | Age: 87
Setting detail: HOSPITAL OUTPATIENT SURGERY
Discharge: HOME | End: 2022-11-17
Attending: INTERNAL MEDICINE | Admitting: INTERNAL MEDICINE

## 2022-11-17 VITALS
BODY MASS INDEX: 25.39 KG/M2 | DIASTOLIC BLOOD PRESSURE: 67 MMHG | WEIGHT: 158 LBS | TEMPERATURE: 97.6 F | OXYGEN SATURATION: 95 % | SYSTOLIC BLOOD PRESSURE: 167 MMHG | HEART RATE: 60 BPM | RESPIRATION RATE: 18 BRPM | HEIGHT: 66 IN

## 2022-11-17 DIAGNOSIS — I49.5 SICK SINUS SYNDROME: ICD-10-CM

## 2022-11-17 LAB — QT INTERVAL: 419 MS

## 2022-11-17 PROCEDURE — 25010000002 MIDAZOLAM PER 1 MG: Performed by: INTERNAL MEDICINE

## 2022-11-17 PROCEDURE — 93010 ELECTROCARDIOGRAM REPORT: CPT | Performed by: INTERNAL MEDICINE

## 2022-11-17 PROCEDURE — C1785 PMKR, DUAL, RATE-RESP: HCPCS | Performed by: INTERNAL MEDICINE

## 2022-11-17 PROCEDURE — 33228 REMV&REPLC PM GEN DUAL LEAD: CPT | Performed by: INTERNAL MEDICINE

## 2022-11-17 PROCEDURE — 25010000002 VANCOMYCIN PER 500 MG: Performed by: INTERNAL MEDICINE

## 2022-11-17 PROCEDURE — 25010000002 FENTANYL CITRATE (PF) 50 MCG/ML SOLUTION: Performed by: INTERNAL MEDICINE

## 2022-11-17 PROCEDURE — 93005 ELECTROCARDIOGRAM TRACING: CPT | Performed by: INTERNAL MEDICINE

## 2022-11-17 DEVICE — PACEMAKER
Type: IMPLANTABLE DEVICE | Status: FUNCTIONAL
Brand: ACCOLADE™ MRI DR

## 2022-11-17 RX ORDER — MIDAZOLAM HYDROCHLORIDE 1 MG/ML
INJECTION INTRAMUSCULAR; INTRAVENOUS
Status: DISCONTINUED | OUTPATIENT
Start: 2022-11-17 | End: 2022-11-17 | Stop reason: HOSPADM

## 2022-11-17 RX ORDER — VANCOMYCIN HYDROCHLORIDE 1 G/200ML
15 INJECTION, SOLUTION INTRAVENOUS ONCE
Status: COMPLETED | OUTPATIENT
Start: 2022-11-17 | End: 2022-11-17

## 2022-11-17 RX ORDER — SODIUM CHLORIDE 0.9 % (FLUSH) 0.9 %
10 SYRINGE (ML) INJECTION AS NEEDED
Status: DISCONTINUED | OUTPATIENT
Start: 2022-11-17 | End: 2022-11-17 | Stop reason: HOSPADM

## 2022-11-17 RX ORDER — SODIUM CHLORIDE 0.9 % (FLUSH) 0.9 %
10 SYRINGE (ML) INJECTION EVERY 12 HOURS SCHEDULED
Status: DISCONTINUED | OUTPATIENT
Start: 2022-11-17 | End: 2022-11-17 | Stop reason: HOSPADM

## 2022-11-17 RX ORDER — FENTANYL CITRATE 50 UG/ML
INJECTION, SOLUTION INTRAMUSCULAR; INTRAVENOUS
Status: DISCONTINUED | OUTPATIENT
Start: 2022-11-17 | End: 2022-11-17 | Stop reason: HOSPADM

## 2022-11-17 RX ORDER — ACETAMINOPHEN 325 MG/1
650 TABLET ORAL EVERY 4 HOURS PRN
Status: DISCONTINUED | OUTPATIENT
Start: 2022-11-17 | End: 2022-11-17 | Stop reason: HOSPADM

## 2022-11-17 RX ORDER — SODIUM CHLORIDE 9 MG/ML
75 INJECTION, SOLUTION INTRAVENOUS CONTINUOUS
Status: DISCONTINUED | OUTPATIENT
Start: 2022-11-17 | End: 2022-11-17 | Stop reason: HOSPADM

## 2022-11-17 RX ADMIN — SODIUM CHLORIDE 75 ML/HR: 9 INJECTION, SOLUTION INTRAVENOUS at 10:51

## 2022-11-17 RX ADMIN — VANCOMYCIN HYDROCHLORIDE 1000 MG: 1 INJECTION, SOLUTION INTRAVENOUS at 10:52

## 2022-11-17 NOTE — DISCHARGE INSTRUCTIONS
"[facility]   [phone]    Post Pacemaker / Defibrillator Implant Instructions      1.  The dressing may be removed the next day.    2. If steri-strips were used, they should not be removed. Allow them to \"fall off\".      3. You may shower after the dressing is removed. Do not allow shower water to hit directly on incision.    4. No lotion/powder/ointment/cream on incision until it is healed.    5. Gently wash incision daily with soap and water and pat dry.    6. You may reapply a dressing if there is drainage, otherwise leave your incision open to air. If you reapply a dressing, please notify the pacemaker clinic.    7. No heavy lifting, pulling, or pushing.    8. Do not raise the affected arm over your head for a minimum of 1 month.    9. The pacemaker clinic will contact you (usually within 1 business day) to schedule a pacemaker/incision check. The check is usually done 7-10 days post-implant. If you have not heard from the pacemaker clinic within 3 days, please call the office.    10.  No driving until seen at your follow up appointment    11. Please call the office if you experience any of the following:   bleeding or drainage from your incision   swelling, redness, or opening of your incision   fever or chills   pain not relieved with medication   chest pain or difficulty breathing   lightheadness    12. For defibrillator patients only: If you receive a shock from your device, please call the office. If you receive 2 or more shocks within a 24 hour period OR if you receive 1 shock and feel poorly, you should be evaluated in the emergency room. Please DO NOT DRIVE if you have received a shock until your device has been checked.   "

## 2022-11-23 ENCOUNTER — CLINICAL SUPPORT NO REQUIREMENTS (OUTPATIENT)
Dept: CARDIOLOGY | Facility: CLINIC | Age: 87
End: 2022-11-23

## 2022-11-23 DIAGNOSIS — I49.5 SICK SINUS SYNDROME: Primary | ICD-10-CM

## 2022-11-23 PROCEDURE — 93280 PM DEVICE PROGR EVAL DUAL: CPT | Performed by: INTERNAL MEDICINE

## 2023-01-11 PROCEDURE — 93294 REM INTERROG EVL PM/LDLS PM: CPT | Performed by: INTERNAL MEDICINE

## 2023-01-11 PROCEDURE — 93296 REM INTERROG EVL PM/IDS: CPT | Performed by: INTERNAL MEDICINE

## 2023-04-12 PROCEDURE — 93294 REM INTERROG EVL PM/LDLS PM: CPT | Performed by: INTERNAL MEDICINE

## 2023-04-12 PROCEDURE — 93296 REM INTERROG EVL PM/IDS: CPT | Performed by: INTERNAL MEDICINE

## 2023-10-31 ENCOUNTER — OFFICE VISIT (OUTPATIENT)
Dept: CARDIOLOGY | Facility: CLINIC | Age: 88
End: 2023-10-31
Payer: MEDICARE

## 2023-10-31 ENCOUNTER — CLINICAL SUPPORT NO REQUIREMENTS (OUTPATIENT)
Age: 88
End: 2023-10-31
Payer: MEDICARE

## 2023-10-31 VITALS
HEART RATE: 60 BPM | HEIGHT: 66 IN | WEIGHT: 151.4 LBS | DIASTOLIC BLOOD PRESSURE: 72 MMHG | BODY MASS INDEX: 24.33 KG/M2 | SYSTOLIC BLOOD PRESSURE: 132 MMHG

## 2023-10-31 DIAGNOSIS — Z95.0 PRESENCE OF PERMANENT CARDIAC PACEMAKER: ICD-10-CM

## 2023-10-31 DIAGNOSIS — I49.5 SICK SINUS SYNDROME: Primary | ICD-10-CM

## 2023-10-31 DIAGNOSIS — I10 BENIGN ESSENTIAL HYPERTENSION: ICD-10-CM

## 2023-10-31 PROCEDURE — 93000 ELECTROCARDIOGRAM COMPLETE: CPT | Performed by: NURSE PRACTITIONER

## 2023-10-31 PROCEDURE — 99214 OFFICE O/P EST MOD 30 MIN: CPT | Performed by: NURSE PRACTITIONER

## 2023-10-31 RX ORDER — ACETAMINOPHEN 500 MG
500 TABLET ORAL EVERY 6 HOURS PRN
COMMUNITY

## 2023-10-31 RX ORDER — VALSARTAN 320 MG/1
TABLET ORAL
COMMUNITY
Start: 2023-10-05

## 2023-10-31 RX ORDER — AMLODIPINE BESYLATE 5 MG/1
TABLET ORAL
COMMUNITY
Start: 2023-10-25

## 2023-10-31 NOTE — PROGRESS NOTES
Date of Office Visit: 10/31/2023  Encounter Provider: SAMANTHA Suárez  Place of Service: [facility] CARDIOLOGY  Patient Name: Alexandria Alcocer  :3/8/1930    No chief complaint on file.  : follow up   Pacemaker check    HPI: Alexandria Alcocer is a 89 y.o. female who is a patient of Dr. Suazo.  She is new to me today and presents for 1 year office follow-up appointment.  She has a history of sick sinus syndrome and hypertension.   She underwent generator change with [employer] generator on 2022 due to MADHAVI.      She is living at the [facility] and has been doing well..  She has no complaints of chest pain, shortness of air or palpitations.  She has some shortness of breath if she walks down the mercedes which is not new for her.  Her blood pressure is well controlled and it is monitored on a daily basis.  Each morning, someone comes in puts her compression hose on, as well.  Pacemaker check today shows 88% atrial paced and 8 years of battery life left.  Patient enjoys participating in activities throughout the day that are offered by [facility].    Previous testing and notes have been reviewed by me.   Past Medical History:   Diagnosis Date    Arthritis     Bell's palsy     Cataract     Cholelithiasis      s/p cholecystectomy ,     Degenerative disc disease, thoracic     Esophageal reflux     10/08 Dr Fuller    Esophagitis     EDG 10/08 Dr Fuller    Gout     H/O Doppler ultrasound of artery carotid     nl , has a plaque without stenosis in the R bifurcation ICA    H/O echocardiogram 2D     2009: EF 60%, mild MR, mild TR, PA pressure 38, diastolic dysfunction 2014: EF 55%, grade I diast dysf-n    Hemorrhoids     Herpes zoster     2011    Hypercholesterolemia     Hyperglycemia     Lightheadedness 10/7/2016    Lung nodule seen on imaging study     none seen on the x ray 2014    Mammogram abnormal      had normal f/u evaluation normal  7/14 [facility] 7/25/15    Near syncope 8/26/2016    Neuralgia, postherpetic     Occupational bursitis     right shoulder    Osteopenia     Dexa 4/7/2011    Postherpetic polyneuropathy     Tubular adenoma of colon      . 10/08, 3 polyps with mild dysplasia, 11/11 2 polyps transverse colon, also had hyperplastic polyp in 2002    Vasovagal syncope     1992    Visit for screening mammogram     nl 06/18/13, 07/2014 [facility]       Past Surgical History:   Procedure Laterality Date    BRONCHOSCOPY      1996, bronchoscopy with Bx - inflammatory lung nodule    CARDIAC ELECTROPHYSIOLOGY PROCEDURE N/A 2/6/2017    Procedure: Pacemaker DC new, [employer];  Surgeon: Liban Suazo MD;  Location:  [facility];  Service:     CARDIAC ELECTROPHYSIOLOGY PROCEDURE N/A 11/17/2022    Procedure: PPM generator change - dual--same company [employer];  Surgeon: Fred Nugent MD;  Location:  [facility];  Service: Cardiology;  Laterality: N/A;    CATARACT EXTRACTION      CHOLECYSTECTOMY      Dr Ruiz    COLONOSCOPY      · , 11/29/2011, tubular adenoma 3-5 mm x 2 in the transverse colon.    INCISIONAL BREAST BIOPSY      1998 also had ducrogram due to D/C    POLYPECTOMY      TONSILLECTOMY      TOTAL ABDOMINAL HYSTERECTOMY      with removal of both ovaries       Social History     Socioeconomic History    Marital status: Single   Tobacco Use    Smoking status: Never    Smokeless tobacco: Never   Vaping Use    Vaping Use: Never used   Substance and Sexual Activity    Alcohol use: No    Drug use: No    Sexual activity: Defer       Family History   Problem Relation Age of Onset    Breast cancer Mother     Lung cancer Mother     Cardiomyopathy Mother     Hypertension Mother     Breast cancer Sister     Lymphoma Sister     Hypertension Sister     Macular degeneration Sister     Lung cancer Sister         non smoker    Diabetes Brother     Hypertension Brother     Lung cancer Brother   "      Review of Systems   Constitutional: Negative.   HENT: Negative.     Eyes: Negative.    Cardiovascular:  Positive for leg swelling.        Wearing compression hose   Respiratory: Negative.     Endocrine: Negative.    Hematologic/Lymphatic: Negative.    Skin: Negative.    Musculoskeletal: Negative.    Gastrointestinal: Negative.    Genitourinary: Negative.    Neurological: Negative.    Psychiatric/Behavioral: Negative.     Allergic/Immunologic: Negative.        No Known Allergies      Current Outpatient Medications:     acetaminophen (TYLENOL) 500 MG tablet, Take 1 tablet by mouth Every 6 (Six) Hours As Needed for Mild Pain., Disp: , Rfl:     allopurinol (ZYLOPRIM) 100 MG tablet, Take 1 tablet by mouth Daily., Disp: , Rfl:     amLODIPine (NORVASC) 5 MG tablet, , Disp: , Rfl:     cyanocobalamin 1000 MCG/ML injection, , Disp: , Rfl:     Hydrocortisone Acetate 1.12 %(1% Base) cream, Apply 1 application topically See Admin Instructions. Apply to neck tid prn, Disp: , Rfl:     melatonin 5 MG tablet tablet, Take 1 tablet by mouth Every Night., Disp: , Rfl:     metoprolol tartrate (LOPRESSOR) 50 MG tablet, Take 1 tablet by mouth 2 (Two) Times a Day., Disp: 180 tablet, Rfl: 3    polyethyl glycol-propyl glycol (SYSTANE) 0.4-0.3 % solution ophthalmic solution (artificial tears), Administer 1 drop to both eyes 2 (Two) Times a Day., Disp: , Rfl:     valsartan (DIOVAN) 320 MG tablet, , Disp: , Rfl:     vitamin D (ERGOCALCIFEROL) 1.25 MG (64533 UT) capsule capsule, Take 1 capsule by mouth Every 14 (Fourteen) Days., Disp: , Rfl:       Objective:     Vitals:    10/31/23 1002   BP: 132/72   Pulse: 60   Weight: 68.7 kg (151 lb 6.4 oz)   Height: 167.6 cm (65.98\")       Body mass index is 24.45 kg/m².    PHYSICAL EXAM:    Constitutional:       Appearance: Healthy appearance. Not in distress.   Neck:      Vascular: No JVR. JVD normal.   Pulmonary:      Effort: Pulmonary effort is normal.      Breath sounds: Normal breath sounds. No " wheezing. No rhonchi. No rales.   Chest:      Chest wall: Not tender to palpatation.   Cardiovascular:      PMI at left midclavicular line. Normal rate. Regular rhythm. Normal S1. Normal S2.       Murmurs: There is no murmur.      No gallop.  No click. No rub.      Comments: Generalized bilateral lower extremity edema  Pulses:     Intact distal pulses.   Edema:     Peripheral edema absent.   Abdominal:      General: Bowel sounds are normal.      Palpations: Abdomen is soft.      Tenderness: There is no abdominal tenderness.   Musculoskeletal: Normal range of motion.         General: No tenderness. Skin:     General: Skin is warm and dry.   Neurological:      General: No focal deficit present.      Mental Status: Alert and oriented to person, place and time.           ECG 12 Lead    Date/Time: 10/31/2023 10:56 AM  Performed by: Dolly Odonnell APRN    Authorized by: Dolly Odonnell APRN  Comparison: compared with previous ECG from 11/17/2022  Similar to previous ECG  Rhythm: paced  BPM: 60  Pacing capture: atrial paced.          Assessment:       Diagnosis Plan   1. Sick sinus syndrome        2. Benign essential hypertension        3. Presence of permanent cardiac pacemaker            No orders of the defined types were placed in this encounter.         Plan:       1.  Sick sinus syndrome: PPM generator change with [employer] (11/2022) d/t battery MADHAVI.  ([employer]).  Normal functioning device.  8 yrs of battery life left.   2.  Hypertension: well cotrolled on current medication    Ms. Alcocer will follow up with me in one year and have pacemaker check at that time.          Your medication list            Accurate as of October 31, 2023 10:45 AM. If you have any questions, ask your nurse or doctor.                CONTINUE taking these medications        Instructions Last Dose Given Next Dose Due   acetaminophen 500 MG tablet  Commonly known as: TYLENOL      Take 1 tablet by mouth Every 6 (Six) Hours As  Needed for Mild Pain.       allopurinol 100 MG tablet  Commonly known as: ZYLOPRIM      Take 1 tablet by mouth Daily.       amLODIPine 5 MG tablet  Commonly known as: NORVASC           cyanocobalamin 1000 MCG/ML injection           Hydrocortisone Acetate 1.12 %(1% Base) cream      Apply 1 application topically See Admin Instructions. Apply to neck tid prn       melatonin 5 MG tablet tablet      Take 1 tablet by mouth Every Night.       metoprolol tartrate 50 MG tablet  Commonly known as: LOPRESSOR      Take 1 tablet by mouth 2 (Two) Times a Day.       polyethyl glycol-propyl glycol 0.4-0.3 % solution ophthalmic solution (artificial tears)  Commonly known as: SYSTANE      Administer 1 drop to both eyes 2 (Two) Times a Day.       valsartan 320 MG tablet  Commonly known as: DIOVAN           vitamin D 1.25 MG (44937 UT) capsule capsule  Commonly known as: ERGOCALCIFEROL      Take 1 capsule by mouth Every 14 (Fourteen) Days.              STOP taking these medications      valsartan-hydrochlorothiazide 320-12.5 MG per tablet  Commonly known as: DIOVAN-HCT  Stopped by: SAMANTHA Suárez                   As always, it has been a pleasure to participate in your patient's care.      Sincerely,       SAMANTHA Galvin

## 2024-07-26 PROCEDURE — 93294 REM INTERROG EVL PM/LDLS PM: CPT | Performed by: INTERNAL MEDICINE

## 2024-07-26 PROCEDURE — 93296 REM INTERROG EVL PM/IDS: CPT | Performed by: INTERNAL MEDICINE

## 2024-11-04 ENCOUNTER — CLINICAL SUPPORT NO REQUIREMENTS (OUTPATIENT)
Age: 89
End: 2024-11-04
Payer: MEDICARE

## 2024-11-04 ENCOUNTER — OFFICE VISIT (OUTPATIENT)
Dept: CARDIOLOGY | Facility: CLINIC | Age: 89
End: 2024-11-04
Payer: MEDICARE

## 2024-11-04 VITALS
WEIGHT: 154 LBS | SYSTOLIC BLOOD PRESSURE: 132 MMHG | BODY MASS INDEX: 25.66 KG/M2 | HEIGHT: 65 IN | DIASTOLIC BLOOD PRESSURE: 64 MMHG | HEART RATE: 60 BPM

## 2024-11-04 DIAGNOSIS — Z95.0 PRESENCE OF PERMANENT CARDIAC PACEMAKER: ICD-10-CM

## 2024-11-04 DIAGNOSIS — I49.5 SICK SINUS SYNDROME: Primary | ICD-10-CM

## 2024-11-04 DIAGNOSIS — I49.5 SICK SINUS SYNDROME: ICD-10-CM

## 2024-11-04 DIAGNOSIS — I10 BENIGN ESSENTIAL HYPERTENSION: Primary | ICD-10-CM

## 2024-11-04 PROCEDURE — 99214 OFFICE O/P EST MOD 30 MIN: CPT | Performed by: NURSE PRACTITIONER

## 2024-11-04 PROCEDURE — 93000 ELECTROCARDIOGRAM COMPLETE: CPT | Performed by: NURSE PRACTITIONER

## 2024-11-04 RX ORDER — SENNOSIDES A AND B 8.6 MG/1
1 TABLET, FILM COATED ORAL DAILY
COMMUNITY

## 2024-11-04 RX ORDER — VALSARTAN 320 MG/1
320 TABLET ORAL DAILY
Qty: 90 TABLET | Refills: 3 | Status: SHIPPED | OUTPATIENT
Start: 2024-11-04

## 2024-11-04 RX ORDER — METOPROLOL TARTRATE 50 MG
50 TABLET ORAL 2 TIMES DAILY
Qty: 180 TABLET | Refills: 3 | Status: SHIPPED | OUTPATIENT
Start: 2024-11-04

## 2024-11-04 RX ORDER — METHENAMINE, SODIUM PHOSPHATE, MONOBASIC, MONOHYDRATE, PHENYL SALICYLATE, METHYLENE BLUE, AND HYOSCYAMINE SULFATE 118; 40.8; 36; 10; .12 MG/1; MG/1; MG/1; MG/1; MG/1
CAPSULE ORAL 4 TIMES DAILY
COMMUNITY

## 2024-11-04 RX ORDER — AMLODIPINE BESYLATE 5 MG/1
5 TABLET ORAL DAILY
Qty: 90 TABLET | Refills: 3 | Status: SHIPPED | OUTPATIENT
Start: 2024-11-04

## 2024-11-04 NOTE — PROGRESS NOTES
Date of Office Visit: 2024  Encounter Provider: SAMANTHA Suárez  Place of Service: [facility] CARDIOLOGY  Patient Name: Alexandria Alcocer  :3/8/1930    No chief complaint on file.  : sick sinus syndrome  Pacemaker check    HPI: Alexandria Alcocer is a 89 y.o. female who is a patient of Dr. Suazo.  She presents for 1 year office follow-up appointment.  She has a history of sick sinus syndrome and hypertension.   She underwent generator change with [employer] generator on 2022 due to MADHAVI.      She is living at the [facility] and has been doing well..  She has no complaints of chest pain, shortness of air or palpitations.  Her blood pressure is well controlled and it is monitored on a daily basis.  Each morning, someone continues to come in and put her compression hose on.  Ways going to mass in the morning and playing bingo throughout the week.  She does walk down to where her meals are and socializes with the other residents.  Pacemaker interrogation today shows no new events.  She has about 7 years of battery life left and is 94% atrial paced.    Previous testing and notes have been reviewed by me.   Past Medical History:   Diagnosis Date    Arthritis     Bell's palsy     Cataract     Cholelithiasis      s/p cholecystectomy ,     Degenerative disc disease, thoracic     Esophageal reflux     10/08 Dr Fuller    Esophagitis     EDG 10/08 Dr Fuller    Gout     H/O Doppler ultrasound of artery carotid     nl , has a plaque without stenosis in the R bifurcation ICA    H/O echocardiogram 2D     2009: EF 60%, mild MR, mild TR, PA pressure 38, diastolic dysfunction 2014: EF 55%, grade I diast dysf-n    Hemorrhoids     Herpes zoster     2011    Hypercholesterolemia     Hyperglycemia     Lightheadedness 10/7/2016    Lung nodule seen on imaging study     none seen on the x ray 2014    Mammogram abnormal      had normal f/u evaluation  normal 7/14 [facility] 7/25/15    Near syncope 8/26/2016    Neuralgia, postherpetic     Occupational bursitis     right shoulder    Osteopenia     Dexa 4/7/2011    Postherpetic polyneuropathy     Tubular adenoma of colon      . 10/08, 3 polyps with mild dysplasia, 11/11 2 polyps transverse colon, also had hyperplastic polyp in 2002    Vasovagal syncope     1992    Visit for screening mammogram     nl 06/18/13, 07/2014 [facility]       Past Surgical History:   Procedure Laterality Date    BRONCHOSCOPY      1996, bronchoscopy with Bx - inflammatory lung nodule    CARDIAC ELECTROPHYSIOLOGY PROCEDURE N/A 2/6/2017    Procedure: Pacemaker DC new, [employer];  Surgeon: Liban Suazo MD;  Location:  [facility];  Service:     CARDIAC ELECTROPHYSIOLOGY PROCEDURE N/A 11/17/2022    Procedure: PPM generator change - dual--same company [employer];  Surgeon: Fred Nugent MD;  Location:  [facility];  Service: Cardiology;  Laterality: N/A;    CATARACT EXTRACTION      CHOLECYSTECTOMY      Dr Ruiz    COLONOSCOPY      · , 11/29/2011, tubular adenoma 3-5 mm x 2 in the transverse colon.    INCISIONAL BREAST BIOPSY      1998 also had ducrogram due to D/C    POLYPECTOMY      TONSILLECTOMY      TOTAL ABDOMINAL HYSTERECTOMY      with removal of both ovaries       Social History     Socioeconomic History    Marital status: Single   Tobacco Use    Smoking status: Never     Passive exposure: Never    Smokeless tobacco: Never   Vaping Use    Vaping status: Never Used   Substance and Sexual Activity    Alcohol use: No    Drug use: No    Sexual activity: Defer       Family History   Problem Relation Age of Onset    Breast cancer Mother     Lung cancer Mother     Cardiomyopathy Mother     Hypertension Mother     Breast cancer Sister     Lymphoma Sister     Hypertension Sister     Macular degeneration Sister     Lung cancer Sister         non smoker    Diabetes Brother     Hypertension  Brother     Lung cancer Brother        Review of Systems   Constitutional: Negative.   HENT: Negative.     Eyes: Negative.    Cardiovascular:  Positive for leg swelling.        Wearing compression hose   Respiratory: Negative.     Endocrine: Negative.    Hematologic/Lymphatic: Negative.    Skin: Negative.    Musculoskeletal: Negative.    Gastrointestinal: Negative.    Genitourinary: Negative.    Neurological: Negative.    Psychiatric/Behavioral:  Positive for hallucinations.    Allergic/Immunologic: Negative.        No Known Allergies      Current Outpatient Medications:     acetaminophen (TYLENOL) 500 MG tablet, Take 1 tablet by mouth Every 6 (Six) Hours As Needed for Mild Pain., Disp: , Rfl:     allopurinol (ZYLOPRIM) 100 MG tablet, Take 1 tablet by mouth Daily., Disp: , Rfl:     amLODIPine (NORVASC) 5 MG tablet, , Disp: , Rfl:     cyanocobalamin 1000 MCG/ML injection, , Disp: , Rfl:     Hydrocortisone Acetate 1.12 %(1% Base) cream, Apply 1 application topically See Admin Instructions. Apply to neck tid prn, Disp: , Rfl:     melatonin 5 MG tablet tablet, Take 1 tablet by mouth Every Night., Disp: , Rfl:     metoprolol tartrate (LOPRESSOR) 50 MG tablet, Take 1 tablet by mouth 2 (Two) Times a Day., Disp: 180 tablet, Rfl: 3    polyethyl glycol-propyl glycol (SYSTANE) 0.4-0.3 % solution ophthalmic solution (artificial tears), Administer 1 drop to both eyes 2 (Two) Times a Day., Disp: , Rfl:     Polyethylene Glycol 3350 (MIRALAX PO), Take  by mouth., Disp: , Rfl:     senna 8.6 MG tablet, Take 1 tablet by mouth Daily., Disp: , Rfl:     vitamin D (ERGOCALCIFEROL) 1.25 MG (24297 UT) capsule capsule, Take 1 capsule by mouth Every 14 (Fourteen) Days., Disp: , Rfl:     uribel (URO-MP) 118 MG capsule capsule, Take  by mouth 4 (Four) Times a Day., Disp: , Rfl:     valsartan (DIOVAN) 320 MG tablet, , Disp: , Rfl:       Objective:     Vitals:    11/04/24 1400   BP: 132/64   Pulse: 60   Weight: 69.9 kg (154 lb)   Height: 165.1 cm  "(65\")       Body mass index is 25.63 kg/m².    PHYSICAL EXAM:    Constitutional:       Appearance: Healthy appearance. Not in distress.   Neck:      Vascular: No JVR. JVD normal.   Pulmonary:      Effort: Pulmonary effort is normal.      Breath sounds: Normal breath sounds. No wheezing. No rhonchi. No rales.   Chest:      Chest wall: Not tender to palpatation.   Cardiovascular:      PMI at left midclavicular line. Normal rate. Regular rhythm. Normal S1. Normal S2.       Murmurs: There is no murmur.      No gallop.  No click. No rub.      Comments: Generalized bilateral lower extremity edema  Pulses:     Intact distal pulses.   Edema:     Peripheral edema absent.   Abdominal:      General: Bowel sounds are normal.      Palpations: Abdomen is soft.      Tenderness: There is no abdominal tenderness.   Musculoskeletal: Normal range of motion.         General: No tenderness. Skin:     General: Skin is warm and dry.   Neurological:      General: No focal deficit present.      Mental Status: Alert and oriented to person, place and time.           ECG 12 Lead    Date/Time: 11/4/2024 3:27 PM  Performed by: Dolly Odonnell APRN    Authorized by: Dolly Odonnell APRN  Comparison: compared with previous ECG from 10/31/2023  Similar to previous ECG  Rhythm: paced  BPM: 60  Pacing capture: atrial paced.          Assessment:       Diagnosis Plan   1. Benign essential hypertension        2. Sick sinus syndrome        3. Presence of permanent cardiac pacemaker              No orders of the defined types were placed in this encounter.         Plan:       1.  Sick sinus syndrome: PPM generator change with [employer] (11/2022) d/t battery MADHAVI.  ([employer]).  Normal functioning device.  7 yrs of battery life left.   2.  Hypertension: well controlled on current medication    Ms. Alcocer will follow up with me in one year and have pacemaker check at that time.          Your medication list            Accurate as of November 4, " 2024  2:18 PM. If you have any questions, ask your nurse or doctor.                CONTINUE taking these medications        Instructions Last Dose Given Next Dose Due   acetaminophen 500 MG tablet  Commonly known as: TYLENOL      Take 1 tablet by mouth Every 6 (Six) Hours As Needed for Mild Pain.       allopurinol 100 MG tablet  Commonly known as: ZYLOPRIM      Take 1 tablet by mouth Daily.       amLODIPine 5 MG tablet  Commonly known as: NORVASC           cyanocobalamin 1000 MCG/ML injection           Hydrocortisone Acetate 1.12 %(1% Base) cream      Apply 1 application topically See Admin Instructions. Apply to neck tid prn       melatonin 5 MG tablet tablet      Take 1 tablet by mouth Every Night.       metoprolol tartrate 50 MG tablet  Commonly known as: LOPRESSOR      Take 1 tablet by mouth 2 (Two) Times a Day.       MIRALAX PO      Take  by mouth.       polyethyl glycol-propyl glycol 0.4-0.3 % solution ophthalmic solution (artificial tears)  Commonly known as: SYSTANE      Administer 1 drop to both eyes 2 (Two) Times a Day.       senna 8.6 MG tablet  Commonly known as: SENOKOT      Take 1 tablet by mouth Daily.       uribel 118 MG capsule capsule      Take  by mouth 4 (Four) Times a Day.       valsartan 320 MG tablet  Commonly known as: DIOVAN           vitamin D 1.25 MG (94663 UT) capsule capsule  Commonly known as: ERGOCALCIFEROL      Take 1 capsule by mouth Every 14 (Fourteen) Days.                  As always, it has been a pleasure to participate in your patient's care.      Sincerely,       SAMANTHA Galvin

## 2025-04-28 ENCOUNTER — TELEPHONE (OUTPATIENT)
Dept: CARDIOLOGY | Age: OVER 89
End: 2025-04-28

## 2025-04-28 NOTE — TELEPHONE ENCOUNTER
Please reach out to nurse [facility] and get pt an appt within the next week with SAMANTHA Christina.    MIMI England

## 2025-04-28 NOTE — TELEPHONE ENCOUNTER
Caller: Alexandria Alcocer    Relationship: Self    Best call back number: [phone] ASK FOR [facility] [address] FLOOR NURSE    What is the best time to reach you: ANYTIME    Who are you requesting to speak with (clinical staff, provider,  specific staff member): CLINICAL        What was the call regarding: PT CALLED AND IS LIVING AT [facility].  NURSE WANTED HER TO CALL TO SEE IF SHE NEEDED TO COME IN FOR APPT.  BOTH OF HER LEGS ARE SWELLING AND WEEPING.  PLEASE CALL HER NURSE AND ADVISE        Family/Patient

## 2025-05-08 ENCOUNTER — OFFICE VISIT (OUTPATIENT)
Dept: CARDIOLOGY | Age: OVER 89
End: 2025-05-08
Payer: MEDICARE

## 2025-05-08 VITALS
OXYGEN SATURATION: 97 % | SYSTOLIC BLOOD PRESSURE: 154 MMHG | WEIGHT: 157 LBS | DIASTOLIC BLOOD PRESSURE: 80 MMHG | BODY MASS INDEX: 26.16 KG/M2 | HEIGHT: 65 IN | HEART RATE: 60 BPM

## 2025-05-08 DIAGNOSIS — I49.5 SICK SINUS SYNDROME: Primary | ICD-10-CM

## 2025-05-08 DIAGNOSIS — R06.09 DYSPNEA ON EXERTION: ICD-10-CM

## 2025-05-08 DIAGNOSIS — I10 BENIGN ESSENTIAL HYPERTENSION: ICD-10-CM

## 2025-05-08 PROCEDURE — 1159F MED LIST DOCD IN RCRD: CPT | Performed by: NURSE PRACTITIONER

## 2025-05-08 PROCEDURE — 99214 OFFICE O/P EST MOD 30 MIN: CPT | Performed by: NURSE PRACTITIONER

## 2025-05-08 PROCEDURE — 93000 ELECTROCARDIOGRAM COMPLETE: CPT | Performed by: NURSE PRACTITIONER

## 2025-05-08 PROCEDURE — 1160F RVW MEDS BY RX/DR IN RCRD: CPT | Performed by: NURSE PRACTITIONER

## 2025-05-08 RX ORDER — POTASSIUM CHLORIDE 1500 MG/1
20 TABLET, EXTENDED RELEASE ORAL DAILY
COMMUNITY
Start: 2025-04-28

## 2025-05-08 RX ORDER — POTASSIUM CHLORIDE 750 MG/1
10 CAPSULE, EXTENDED RELEASE ORAL DAILY
COMMUNITY
Start: 2025-04-26

## 2025-05-08 NOTE — PROGRESS NOTES
Date of Office Visit: 2025  Encounter Provider: SAMANTHA Tavares  Place of Service: [facility]  Patient Name: Alexandria Alcocer  :3/8/1930    Chief complaint: SSS- PPM    HPI: Alexandria Alcocer is a 89 y.o. female who is a patient of  Dr. Lakeisha moore and is new to me today.  She has a history of sick sinus syndrome and hypertension.  In 2022 she underwent generator change due to MADHAVI.  She lives at [place] home and has been doing well.  She is here for yearly follow-up today.    She comes in today because over the last couple of weeks she has had some swelling in her lower extremities.  She states she is been a little bit short of breath but not really.  She started taking Lasix 20 mg twice a day at [place] where she lives.  They did a BNP that was normal at 48.  She started having compression socks and it has gone down pretty significantly.  She is on amlodipine for blood pressure but has been on this for several years.  She denies any chest pain or palpitations.  She does do a lot of sitting.  Her activity consists of walking to and from the dining [place] with her walker.  She has been trying to remember to sit with her legs propped up.    Previous testing and notes have been reviewed by me.     Recent labs show BUN of 23, creatinine of 1.0, BNP 48, TSH 0.689, white blood cell count 5.5, hemoglobin and hematocrit 12.9 and 39, platelets 181      Past Medical History:   Diagnosis Date    Arthritis     Bell's palsy     Cataract     Cholelithiasis      s/p cholecystectomy ,     Degenerative disc disease, thoracic     Esophageal reflux     10/08 Dr Fuller    Esophagitis     EDG 10/08 Dr Fuller    Gout     H/O Doppler ultrasound of artery carotid     nl , has a plaque without stenosis in the R bifurcation ICA    H/O echocardiogram 2D     2009: EF 60%, mild MR, mild TR, PA pressure 38, diastolic dysfunction 2014: EF 55%, grade I diast  dysf-n    Hemorrhoids     Herpes zoster     7/2011    Hypercholesterolemia     Hyperglycemia     Lightheadedness 10/7/2016    Lung nodule seen on imaging study     none seen on the x ray 11/2014    Mammogram abnormal     2013 had normal f/u evaluation normal 7/14 [facility] 7/25/15    Near syncope 8/26/2016    Neuralgia, postherpetic     Occupational bursitis     right shoulder    Osteopenia     Dexa 4/7/2011    Postherpetic polyneuropathy     Tubular adenoma of colon      . 10/08, 3 polyps with mild dysplasia, 11/11 2 polyps transverse colon, also had hyperplastic polyp in 2002    Vasovagal syncope     1992    Visit for screening mammogram     nl 06/18/13, 07/2014 [facility]       Past Surgical History:   Procedure Laterality Date    BRONCHOSCOPY      1996, bronchoscopy with Bx - inflammatory lung nodule    CARDIAC ELECTROPHYSIOLOGY PROCEDURE N/A 2/6/2017    Procedure: Pacemaker DC new, [employer];  Surgeon: Liban Suazo MD;  Location:  [facility];  Service:     CARDIAC ELECTROPHYSIOLOGY PROCEDURE N/A 11/17/2022    Procedure: PPM generator change - dual--same company [employer];  Surgeon: Fred Nugent MD;  Location:  [facility];  Service: Cardiology;  Laterality: N/A;    CATARACT EXTRACTION      CHOLECYSTECTOMY      Dr Ruiz    COLONOSCOPY      · , 11/29/2011, tubular adenoma 3-5 mm x 2 in the transverse colon.    INCISIONAL BREAST BIOPSY      1998 also had ducrogram due to D/C    POLYPECTOMY      TONSILLECTOMY      TOTAL ABDOMINAL HYSTERECTOMY      with removal of both ovaries       Social History     Socioeconomic History    Marital status: Single   Tobacco Use    Smoking status: Never     Passive exposure: Never    Smokeless tobacco: Never   Vaping Use    Vaping status: Never Used   Substance and Sexual Activity    Alcohol use: No    Drug use: No    Sexual activity: Defer       Family History   Problem Relation Age of Onset    Breast cancer Mother      Lung cancer Mother     Cardiomyopathy Mother     Hypertension Mother     Breast cancer Sister     Lymphoma Sister     Hypertension Sister     Macular degeneration Sister     Lung cancer Sister         non smoker    Diabetes Brother     Hypertension Brother     Lung cancer Brother        Review of Systems   Cardiovascular:  Positive for leg swelling.       No Known Allergies      Current Outpatient Medications:     acetaminophen (TYLENOL) 500 MG tablet, Take 1 tablet by mouth Every 6 (Six) Hours As Needed for Mild Pain., Disp: , Rfl:     allopurinol (ZYLOPRIM) 100 MG tablet, Take 1 tablet by mouth Daily., Disp: , Rfl:     amLODIPine (NORVASC) 5 MG tablet, Take 1 tablet by mouth Daily., Disp: 90 tablet, Rfl: 3    cyanocobalamin 1000 MCG/ML injection, , Disp: , Rfl:     Hydrocortisone Acetate 1.12 %(1% Base) cream, Apply 1 application topically See Admin Instructions. Apply to neck tid prn, Disp: , Rfl:     melatonin 5 MG tablet tablet, Take 1 tablet by mouth Every Night., Disp: , Rfl:     metoprolol tartrate (LOPRESSOR) 50 MG tablet, Take 1 tablet by mouth 2 (Two) Times a Day., Disp: 180 tablet, Rfl: 3    polyethyl glycol-propyl glycol (SYSTANE) 0.4-0.3 % solution ophthalmic solution (artificial tears), Administer 1 drop to both eyes 2 (Two) Times a Day., Disp: , Rfl:     Polyethylene Glycol 3350 (MIRALAX PO), Take  by mouth., Disp: , Rfl:     potassium chloride (KLOR-CON M20) 20 MEQ CR tablet, Take 1 tablet by mouth Daily., Disp: , Rfl:     potassium chloride (MICRO-K) 10 MEQ CR capsule, Take 1 capsule by mouth Daily., Disp: , Rfl:     senna 8.6 MG tablet, Take 1 tablet by mouth Daily., Disp: , Rfl:     uribel (URO-MP) 118 MG capsule capsule, Take  by mouth 4 (Four) Times a Day., Disp: , Rfl:     valsartan (DIOVAN) 320 MG tablet, Take 1 tablet by mouth Daily., Disp: 90 tablet, Rfl: 3    vitamin D (ERGOCALCIFEROL) 1.25 MG (74160 UT) capsule capsule, Take 1 capsule by mouth Every 14 (Fourteen) Days., Disp: , Rfl:      "    Objective:     Vitals:    05/08/25 1433   BP: 154/80   Pulse: 60   SpO2: 97%   Weight: 71.2 kg (157 lb)   Height: 165.1 cm (65\")     Body mass index is 26.13 kg/m².    PHYSICAL EXAM:    Constitutional:       General: Not in acute distress.     Appearance: Normal appearance. Well-developed.   Eyes:      Pupils: Pupils are equal, round, and reactive to light.   HENT:      Head: Normocephalic.   Neck:      Vascular: No carotid bruit or JVD.   Pulmonary:      Effort: Pulmonary effort is normal. No tachypnea.      Breath sounds: Normal breath sounds. No wheezing. No rales.   Cardiovascular:      Normal rate. Regular rhythm.      No gallop.    Pulses:     Intact distal pulses.   Edema:     Peripheral edema present.     Pretibial: bilateral trace edema of the pretibial area.     Ankle: bilateral 1+ edema of the ankle.  Abdominal:      General: Bowel sounds are normal.      Palpations: Abdomen is soft.      Tenderness: There is no abdominal tenderness.   Musculoskeletal: Normal range of motion.      Cervical back: Normal range of motion and neck supple. No edema. Skin:     General: Skin is warm and dry.   Neurological:      Mental Status: Alert and oriented to person, place, and time.           ECG 12 Lead    Date/Time: 5/8/2025 3:53 PM  Performed by: Carri Shay APRN    Authorized by: Carri Shay APRN  Comparison: compared with previous ECG from 11/4/2024  Similar to previous ECG  Rhythm: sinus rhythm and paced  Rate: normal  QRS axis: normal  Pacing: atrial sensed rhythm and ventricular paced rhythm  Clinical impression: non-specific ECG              Assessment/Plan:      1.  Peripheral edema-continue Lasix 20 mg twice a day, watch sodium intake, continue with compression.  Will get echocardiogram with next visit it has been since 2014    2.  Dyspnea on exertion-check echocardiogram.  Volume status seems to be improving.    3.  Permanent pacemaker-stable device function being monitored by our office with " Dr. Nugent.    4.  Essential hypertension-blood pressures have been in the 140s for the most part given her age would not be really aggressive due to the risk of orthostasis.  She is on amlodipine 5 mg a day which can cause edema but its never bothered her before.  Should edema worsen or not resolve may want to consider switching to something else.  Continue metoprolol tartrate 50 mg twice a day and valsartan 320 mg daily.    1 month with Dr. Yu         Your medication list            Accurate as of May 8, 2025  3:17 PM. If you have any questions, ask your nurse or doctor.                CONTINUE taking these medications        Instructions Last Dose Given Next Dose Due   acetaminophen 500 MG tablet  Commonly known as: TYLENOL      Take 1 tablet by mouth Every 6 (Six) Hours As Needed for Mild Pain.       allopurinol 100 MG tablet  Commonly known as: ZYLOPRIM      Take 1 tablet by mouth Daily.       amLODIPine 5 MG tablet  Commonly known as: NORVASC      Take 1 tablet by mouth Daily.       cyanocobalamin 1000 MCG/ML injection           Hydrocortisone Acetate 1.12 %(1% Base) cream      Apply 1 application topically See Admin Instructions. Apply to neck tid prn       melatonin 5 MG tablet tablet      Take 1 tablet by mouth Every Night.       metoprolol tartrate 50 MG tablet  Commonly known as: LOPRESSOR      Take 1 tablet by mouth 2 (Two) Times a Day.       MIRALAX PO      Take  by mouth.       polyethyl glycol-propyl glycol 0.4-0.3 % solution ophthalmic solution (artificial tears)  Commonly known as: SYSTANE      Administer 1 drop to both eyes 2 (Two) Times a Day.       potassium chloride 10 MEQ CR capsule  Commonly known as: MICRO-K      Take 1 capsule by mouth Daily.       potassium chloride 20 MEQ CR tablet  Commonly known as: KLOR-CON M20      Take 1 tablet by mouth Daily.       senna 8.6 MG tablet  Commonly known as: SENOKOT      Take 1 tablet by mouth Daily.       uribel 118 MG capsule capsule      Take  by  mouth 4 (Four) Times a Day.       valsartan 320 MG tablet  Commonly known as: DIOVAN      Take 1 tablet by mouth Daily.       vitamin D 1.25 MG (63176 UT) capsule capsule  Commonly known as: ERGOCALCIFEROL      Take 1 capsule by mouth Every 14 (Fourteen) Days.                  As always, it has been a pleasure to participate in your patient's care.      Sincerely,     Carri SINGH

## 2025-06-10 ENCOUNTER — OFFICE VISIT (OUTPATIENT)
Age: OVER 89
End: 2025-06-10
Payer: MEDICARE

## 2025-06-10 ENCOUNTER — HOSPITAL ENCOUNTER (OUTPATIENT)
Dept: CARDIOLOGY | Facility: HOSPITAL | Age: OVER 89
Discharge: HOME | End: 2025-06-10
Admitting: NURSE PRACTITIONER
Payer: MEDICARE

## 2025-06-10 VITALS
OXYGEN SATURATION: 99 % | BODY MASS INDEX: 25.83 KG/M2 | DIASTOLIC BLOOD PRESSURE: 80 MMHG | HEIGHT: 65 IN | WEIGHT: 155 LBS | HEART RATE: 62 BPM | SYSTOLIC BLOOD PRESSURE: 148 MMHG

## 2025-06-10 VITALS
HEIGHT: 65 IN | WEIGHT: 157 LBS | BODY MASS INDEX: 26.16 KG/M2 | SYSTOLIC BLOOD PRESSURE: 160 MMHG | DIASTOLIC BLOOD PRESSURE: 76 MMHG

## 2025-06-10 DIAGNOSIS — R60.0 LOWER EXTREMITY EDEMA: ICD-10-CM

## 2025-06-10 DIAGNOSIS — I10 BENIGN ESSENTIAL HYPERTENSION: Primary | ICD-10-CM

## 2025-06-10 DIAGNOSIS — I49.5 SICK SINUS SYNDROME: ICD-10-CM

## 2025-06-10 DIAGNOSIS — E78.00 PURE HYPERCHOLESTEROLEMIA: ICD-10-CM

## 2025-06-10 DIAGNOSIS — Z95.0 PRESENCE OF PERMANENT CARDIAC PACEMAKER: ICD-10-CM

## 2025-06-10 DIAGNOSIS — R06.09 DYSPNEA ON EXERTION: ICD-10-CM

## 2025-06-10 LAB
AORTIC DIMENSIONLESS INDEX: 0.98 (DI)
ASCENDING AORTA: 3.3 CM
AV MEAN PRESS GRAD SYS DOP V1V2: 2 MMHG
AV VMAX SYS DOP: 101 CM/SEC
BH CV ECHO MEAS - ACS: 1.46 CM
BH CV ECHO MEAS - AI P1/2T: 448.7 MSEC
BH CV ECHO MEAS - AO MAX PG: 4.1 MMHG
BH CV ECHO MEAS - AO ROOT DIAM: 3.3 CM
BH CV ECHO MEAS - AO V2 VTI: 25.9 CM
BH CV ECHO MEAS - AVA(I,D): 1.85 CM2
BH CV ECHO MEAS - EDV(CUBED): 83.4 ML
BH CV ECHO MEAS - EDV(MOD-SP2): 92 ML
BH CV ECHO MEAS - EDV(MOD-SP4): 94 ML
BH CV ECHO MEAS - EF(MOD-SP2): 69.6 %
BH CV ECHO MEAS - EF(MOD-SP4): 71.3 %
BH CV ECHO MEAS - ESV(CUBED): 23.4 ML
BH CV ECHO MEAS - ESV(MOD-SP2): 28 ML
BH CV ECHO MEAS - ESV(MOD-SP4): 27 ML
BH CV ECHO MEAS - FS: 34.6 %
BH CV ECHO MEAS - IVS/LVPW: 1.08 CM
BH CV ECHO MEAS - IVSD: 0.71 CM
BH CV ECHO MEAS - LAT PEAK E' VEL: 7.2 CM/SEC
BH CV ECHO MEAS - LV DIASTOLIC VOL/BSA (35-75): 52.7 CM2
BH CV ECHO MEAS - LV MASS(C)D: 87.5 GRAMS
BH CV ECHO MEAS - LV MAX PG: 3.9 MMHG
BH CV ECHO MEAS - LV MEAN PG: 2 MMHG
BH CV ECHO MEAS - LV SYSTOLIC VOL/BSA (12-30): 15.1 CM2
BH CV ECHO MEAS - LV V1 MAX: 98.8 CM/SEC
BH CV ECHO MEAS - LV V1 VTI: 25.4 CM
BH CV ECHO MEAS - LVIDD: 4.4 CM
BH CV ECHO MEAS - LVIDS: 2.9 CM
BH CV ECHO MEAS - LVOT AREA: 1.88 CM2
BH CV ECHO MEAS - LVOT DIAM: 1.55 CM
BH CV ECHO MEAS - LVPWD: 0.65 CM
BH CV ECHO MEAS - MED PEAK E' VEL: 6.6 CM/SEC
BH CV ECHO MEAS - MV A DUR: 0.16 SEC
BH CV ECHO MEAS - MV A MAX VEL: 94 CM/SEC
BH CV ECHO MEAS - MV DEC SLOPE: 407.9 CM/SEC2
BH CV ECHO MEAS - MV DEC TIME: 0.21 SEC
BH CV ECHO MEAS - MV E MAX VEL: 58.9 CM/SEC
BH CV ECHO MEAS - MV E/A: 0.63
BH CV ECHO MEAS - MV MAX PG: 5 MMHG
BH CV ECHO MEAS - MV MEAN PG: 1.81 MMHG
BH CV ECHO MEAS - MV P1/2T: 68.2 MSEC
BH CV ECHO MEAS - MV V2 VTI: 26.8 CM
BH CV ECHO MEAS - MVA(P1/2T): 3.2 CM2
BH CV ECHO MEAS - MVA(VTI): 1.78 CM2
BH CV ECHO MEAS - PA ACC TIME: 0.14 SEC
BH CV ECHO MEAS - PA V2 MAX: 129 CM/SEC
BH CV ECHO MEAS - PULM A REVS DUR: 0.12 SEC
BH CV ECHO MEAS - PULM A REVS VEL: 27.6 CM/SEC
BH CV ECHO MEAS - PULM DIAS VEL: 25.7 CM/SEC
BH CV ECHO MEAS - PULM S/D: 2.15
BH CV ECHO MEAS - PULM SYS VEL: 55.3 CM/SEC
BH CV ECHO MEAS - QP/QS: 1.18
BH CV ECHO MEAS - RAP SYSTOLE: 3 MMHG
BH CV ECHO MEAS - RV MAX PG: 3.1 MMHG
BH CV ECHO MEAS - RV V1 MAX: 88.6 CM/SEC
BH CV ECHO MEAS - RV V1 VTI: 22 CM
BH CV ECHO MEAS - RVOT DIAM: 1.8 CM
BH CV ECHO MEAS - RVSP: 34.4 MMHG
BH CV ECHO MEAS - SV(LVOT): 47.9 ML
BH CV ECHO MEAS - SV(MOD-SP2): 64 ML
BH CV ECHO MEAS - SV(MOD-SP4): 67 ML
BH CV ECHO MEAS - SV(RVOT): 56.3 ML
BH CV ECHO MEAS - SVI(LVOT): 26.8 ML/M2
BH CV ECHO MEAS - SVI(MOD-SP2): 35.9 ML/M2
BH CV ECHO MEAS - SVI(MOD-SP4): 37.5 ML/M2
BH CV ECHO MEAS - TAPSE (>1.6): 2.12 CM
BH CV ECHO MEAS - TR MAX PG: 31.4 MMHG
BH CV ECHO MEAS - TR MAX VEL: 280.2 CM/SEC
BH CV ECHO MEASUREMENTS AVERAGE E/E' RATIO: 8.54
BH CV XLRA - RV BASE: 3 CM
BH CV XLRA - RV LENGTH: 6.8 CM
BH CV XLRA - RV MID: 2.8 CM
BH CV XLRA - TDI S': 8.7 CM/SEC
LEFT ATRIUM VOLUME INDEX: 20.8 ML/M2
LV EF BIPLANE MOD: 70.9 %
SINUS: 3 CM
STJ: 2.6 CM

## 2025-06-10 PROCEDURE — 1160F RVW MEDS BY RX/DR IN RCRD: CPT | Performed by: STUDENT IN AN ORGANIZED HEALTH CARE EDUCATION/TRAINING PROGRAM

## 2025-06-10 PROCEDURE — 1159F MED LIST DOCD IN RCRD: CPT | Performed by: STUDENT IN AN ORGANIZED HEALTH CARE EDUCATION/TRAINING PROGRAM

## 2025-06-10 PROCEDURE — 99214 OFFICE O/P EST MOD 30 MIN: CPT | Performed by: STUDENT IN AN ORGANIZED HEALTH CARE EDUCATION/TRAINING PROGRAM

## 2025-06-10 PROCEDURE — 93306 TTE W/DOPPLER COMPLETE: CPT

## 2025-06-10 RX ORDER — FUROSEMIDE 20 MG/1
20 TABLET ORAL 2 TIMES DAILY
COMMUNITY

## 2025-06-10 NOTE — PROGRESS NOTES
Subjective:     Encounter Date:06/10/2025      Patient ID: Alexandria Alcocer is a 89 y.o. female.    Chief Complaint:  Lower ext edema    HPI:   89 y.o. female , previously followed by Dr. Suazo, with hypertension, sick sinus syndrome status post pacemaker who presents for follow-up.  Patient was last seen by Carri in May for evaluation of lower extremity edema.  Echocardiogram was ordered and she had this done this morning which revealed normal EF with mild diastolic dysfunction.    The following portions of the patient's history were reviewed and updated as appropriate: allergies, current medications, past family history, past medical history, past social history, past surgical history and problem list.     REVIEW OF SYSTEMS:   All systems reviewed.  Pertinent positives identified in HPI.  All other systems are negative.    Past Medical History:   Diagnosis Date    Arthritis     Bell's palsy     Cataract     Cholelithiasis      s/p cholecystectomy 1997,     Degenerative disc disease, thoracic     Esophageal reflux     10/08 Dr Fuller    Esophagitis     EDG 10/08 Dr Fuller    Gout     H/O Doppler ultrasound of artery carotid     nl 09/13, has a plaque without stenosis in the R bifurcation ICA    H/O echocardiogram 2D     07/2009: EF 60%, mild MR, mild TR, PA pressure 38, diastolic dysfunction 07/2014: EF 55%, grade I diast dysf-n    Hemorrhoids     Herpes zoster     7/2011    Hypercholesterolemia     Hyperglycemia     Lightheadedness 10/7/2016    Lung nodule seen on imaging study     none seen on the x ray 11/2014    Mammogram abnormal     2013 had normal f/u evaluation normal 7/14 [facility] 7/25/15    Near syncope 8/26/2016    Neuralgia, postherpetic     Occupational bursitis     right shoulder    Osteopenia     Dexa 4/7/2011    Postherpetic polyneuropathy     Tubular adenoma of colon      . 10/08, 3 polyps with mild dysplasia, 11/11 2 polyps transverse colon, also had hyperplastic polyp in 2002     Vasovagal syncope     1992    Visit for screening mammogram     nl 06/18/13, 07/2014 [facility]       Family History   Problem Relation Age of Onset    Breast cancer Mother     Lung cancer Mother     Cardiomyopathy Mother     Hypertension Mother     Breast cancer Sister     Lymphoma Sister     Hypertension Sister     Macular degeneration Sister     Lung cancer Sister         non smoker    Diabetes Brother     Hypertension Brother     Lung cancer Brother        Social History     Socioeconomic History    Marital status: Single   Tobacco Use    Smoking status: Never     Passive exposure: Never    Smokeless tobacco: Never   Vaping Use    Vaping status: Never Used   Substance and Sexual Activity    Alcohol use: No    Drug use: No    Sexual activity: Defer       No Known Allergies    Past Surgical History:   Procedure Laterality Date    BRONCHOSCOPY      1996, bronchoscopy with Bx - inflammatory lung nodule    CARDIAC ELECTROPHYSIOLOGY PROCEDURE N/A 2/6/2017    Procedure: Pacemaker DC new, [employer];  Surgeon: Liban Suazo MD;  Location:  [facility];  Service:     CARDIAC ELECTROPHYSIOLOGY PROCEDURE N/A 11/17/2022    Procedure: PPM generator change - dual--same company [employer];  Surgeon: Fred Nugent MD;  Location:  [facility];  Service: Cardiology;  Laterality: N/A;    CATARACT EXTRACTION      CHOLECYSTECTOMY      Dr Ruiz    COLONOSCOPY      · , 11/29/2011, tubular adenoma 3-5 mm x 2 in the transverse colon.    INCISIONAL BREAST BIOPSY      1998 also had ducrogram due to D/C    POLYPECTOMY      TONSILLECTOMY      TOTAL ABDOMINAL HYSTERECTOMY      with removal of both ovaries       Procedures       Objective:         Vitals:    06/10/25 1454   BP: 148/80   Pulse: 62   SpO2: 99%       PHYSICAL EXAM:  GEN: well appearing, in NAD   HEENT: NCAT, EOMI, moist mucus membranes   Respiratory: CTAB, no wheezes, rales or rhonchi  CV: normal rate, regular rhythm, normal  S1, S2, no murmurs, rubs, gallops, +2 radial pulses b/l  GI: soft, nontender, nondistended  MSK: Legs compression stockings, left greater than right edema  Skin: no rash, warm, dry  Heme/Lymph: no bruising or bleeding  Neuro: Alert and Oriented x 3, grossly normal motor function        Assessment:         (I10) Benign essential hypertension    (Z95.0) Presence of permanent cardiac pacemaker    (E78.00) Pure hypercholesterolemia    (I49.5) Sick sinus syndrome    (R60.0) Lower extremity edema    89 y.o. female , previously followed by Dr. Suazo, with hypertension, sick sinus syndrome status post pacemaker who presents for follow-up.        Plan:       #Lower extremity edema  Likely secondary to dependent edema.  Has improved.  Can continue compression stockings and Lasix given diastolic dysfunction.    #Hypertension  Currently at goal.  -Continue amlodipine 5 mg daily, Toprol tartrate 50 mg twice daily, valsartan 320 mg daily    #Sick sinus syndrome status post pacemaker  Continue regular scheduled pacemaker check.    Yann Salmon MD, thank you very much for referring this kind patient to me. Please call me with any questions or concerns. I will see the patient again in the office in 6 months or earlier as needed.         Berlin Yu MD, [facility], [facility]  06/10/25  [facility]    Outpatient Encounter Medications as of 6/10/2025   Medication Sig Dispense Refill    acetaminophen (TYLENOL) 500 MG tablet Take 1 tablet by mouth Every 6 (Six) Hours As Needed for Mild Pain.      allopurinol (ZYLOPRIM) 100 MG tablet Take 1 tablet by mouth Daily.      amLODIPine (NORVASC) 5 MG tablet Take 1 tablet by mouth Daily. 90 tablet 3    cyanocobalamin 1000 MCG/ML injection       furosemide (LASIX) 20 MG tablet Take 1 tablet by mouth 2 (Two) Times a Day.      Hydrocortisone Acetate 1.12 %(1% Base) cream Apply 1 application topically See Admin Instructions. Apply to neck tid prn      melatonin 5 MG tablet tablet Take 1  tablet by mouth Every Night.      Menthol, Topical Analgesic, 4 % gel Apply  topically.      metoprolol tartrate (LOPRESSOR) 50 MG tablet Take 1 tablet by mouth 2 (Two) Times a Day. 180 tablet 3    polyethyl glycol-propyl glycol (SYSTANE) 0.4-0.3 % solution ophthalmic solution (artificial tears) Administer 1 drop to both eyes 2 (Two) Times a Day.      Polyethylene Glycol 3350 (MIRALAX PO) Take  by mouth.      potassium chloride (MICRO-K) 10 MEQ CR capsule Take 1 capsule by mouth Daily.      senna 8.6 MG tablet Take 1 tablet by mouth Daily.      uribel (URO-MP) 118 MG capsule capsule Take  by mouth 4 (Four) Times a Day.      valsartan (DIOVAN) 320 MG tablet Take 1 tablet by mouth Daily. 90 tablet 3    vitamin D (ERGOCALCIFEROL) 1.25 MG (88840 UT) capsule capsule Take 1 capsule by mouth Every 14 (Fourteen) Days.      [DISCONTINUED] potassium chloride (KLOR-CON M20) 20 MEQ CR tablet Take 1 tablet by mouth Daily.       No facility-administered encounter medications on file as of 6/10/2025.

## 2025-07-25 LAB
MC_CV_MDC_IDC_RATE_1: 160
MC_CV_MDC_IDC_ZONE_ID: 1
MDC_IDC_MSMT_BATTERY_REMAINING_LONGEVITY: 78 MO
MDC_IDC_MSMT_BATTERY_REMAINING_PERCENTAGE: 100 %
MDC_IDC_MSMT_BATTERY_STATUS: NORMAL
MDC_IDC_MSMT_LEADCHNL_RA_DTM: NORMAL
MDC_IDC_MSMT_LEADCHNL_RA_IMPEDANCE_VALUE: 820
MDC_IDC_MSMT_LEADCHNL_RA_PACING_THRESHOLD_AMPLITUDE: 0.5
MDC_IDC_MSMT_LEADCHNL_RA_PACING_THRESHOLD_POLARITY: NORMAL
MDC_IDC_MSMT_LEADCHNL_RA_PACING_THRESHOLD_PULSEWIDTH: 0.4
MDC_IDC_MSMT_LEADCHNL_RV_DTM: NORMAL
MDC_IDC_MSMT_LEADCHNL_RV_IMPEDANCE_VALUE: 734
MDC_IDC_MSMT_LEADCHNL_RV_PACING_THRESHOLD_AMPLITUDE: 0.9
MDC_IDC_MSMT_LEADCHNL_RV_PACING_THRESHOLD_POLARITY: NORMAL
MDC_IDC_MSMT_LEADCHNL_RV_PACING_THRESHOLD_PULSEWIDTH: 0.4
MDC_IDC_MSMT_LEADCHNL_RV_SENSING_INTR_AMPL: 25
MDC_IDC_PG_IMPLANT_DTM: NORMAL
MDC_IDC_PG_MFG: NORMAL
MDC_IDC_PG_MODEL: NORMAL
MDC_IDC_PG_SERIAL: NORMAL
MDC_IDC_PG_TYPE: NORMAL
MDC_IDC_SESS_DTM: NORMAL
MDC_IDC_SESS_TYPE: NORMAL
MDC_IDC_SET_BRADY_AT_MODE_SWITCH_RATE: 170
MDC_IDC_SET_BRADY_LOWRATE: 60
MDC_IDC_SET_BRADY_MAX_SENSOR_RATE: 130
MDC_IDC_SET_BRADY_MAX_TRACKING_RATE: 130
MDC_IDC_SET_BRADY_MODE: NORMAL
MDC_IDC_SET_BRADY_PAV_DELAY: 220
MDC_IDC_SET_BRADY_SAV_DELAY: 220
MDC_IDC_SET_LEADCHNL_RA_PACING_AMPLITUDE: 2
MDC_IDC_SET_LEADCHNL_RA_PACING_POLARITY: NORMAL
MDC_IDC_SET_LEADCHNL_RA_PACING_PULSEWIDTH: 0.4
MDC_IDC_SET_LEADCHNL_RA_SENSING_POLARITY: NORMAL
MDC_IDC_SET_LEADCHNL_RA_SENSING_SENSITIVITY: 0.5
MDC_IDC_SET_LEADCHNL_RV_PACING_AMPLITUDE: 1.4
MDC_IDC_SET_LEADCHNL_RV_PACING_POLARITY: NORMAL
MDC_IDC_SET_LEADCHNL_RV_PACING_PULSEWIDTH: 0.4
MDC_IDC_SET_LEADCHNL_RV_SENSING_POLARITY: NORMAL
MDC_IDC_SET_LEADCHNL_RV_SENSING_SENSITIVITY: 2.5
MDC_IDC_SET_ZONE_STATUS: NORMAL
MDC_IDC_SET_ZONE_TYPE: NORMAL
MDC_IDC_STAT_AT_BURDEN_PERCENT: 0
MDC_IDC_STAT_BRADY_RA_PERCENT_PACED: 95
MDC_IDC_STAT_BRADY_RV_PERCENT_PACED: 0

## 2025-07-25 PROCEDURE — 93296 REM INTERROG EVL PM/IDS: CPT | Performed by: INTERNAL MEDICINE

## 2025-07-25 PROCEDURE — 93294 REM INTERROG EVL PM/LDLS PM: CPT | Performed by: INTERNAL MEDICINE

## (undated) DEVICE — LIMB HOLDER, WRIST/ANKLE: Brand: DEROYAL

## (undated) DEVICE — Device

## (undated) DEVICE — RADIFOCUS GLIDEWIRE: Brand: GLIDEWIRE

## (undated) DEVICE — LOU PACE DEFIB: Brand: MEDLINE INDUSTRIES, INC.

## (undated) DEVICE — AIRLIFE™ NASAL OXYGEN CANNULA CURVED, NONFLARED TIP WITH 21 FOOT (6.4 M) CRUSH-RESISTANT TUBING, OVER-THE-EAR STYLE: Brand: AIRLIFE™

## (undated) DEVICE — INTRO TEAR AWAY/LVD W/SD PRT 6F 13CM

## (undated) DEVICE — ELECTRD ECG CARBON/SNP RL FM A/ 5PK

## (undated) DEVICE — TBG PENCL TELESCP MEGADYNE SMOKE EVAC 10FT

## (undated) DEVICE — PLASMABLADE PS200-040 4.0: Brand: PLASMABLADE™